# Patient Record
Sex: MALE | Race: WHITE | NOT HISPANIC OR LATINO | Employment: FULL TIME | URBAN - METROPOLITAN AREA
[De-identification: names, ages, dates, MRNs, and addresses within clinical notes are randomized per-mention and may not be internally consistent; named-entity substitution may affect disease eponyms.]

---

## 2017-12-17 ENCOUNTER — OFFICE VISIT (OUTPATIENT)
Dept: URGENT CARE | Facility: CLINIC | Age: 67
End: 2017-12-17
Payer: MEDICARE

## 2017-12-17 VITALS
WEIGHT: 225 LBS | TEMPERATURE: 98 F | HEART RATE: 73 BPM | BODY MASS INDEX: 38.41 KG/M2 | HEIGHT: 64 IN | OXYGEN SATURATION: 96 % | SYSTOLIC BLOOD PRESSURE: 125 MMHG | RESPIRATION RATE: 18 BRPM | DIASTOLIC BLOOD PRESSURE: 72 MMHG

## 2017-12-17 DIAGNOSIS — R68.89 FLU-LIKE SYMPTOMS: ICD-10-CM

## 2017-12-17 DIAGNOSIS — J32.9 SINUSITIS, UNSPECIFIED CHRONICITY, UNSPECIFIED LOCATION: Primary | ICD-10-CM

## 2017-12-17 LAB
CTP QC/QA: YES
FLUAV AG NPH QL: NEGATIVE
FLUBV AG NPH QL: NEGATIVE

## 2017-12-17 PROCEDURE — 87804 INFLUENZA ASSAY W/OPTIC: CPT | Mod: QW,S$GLB,, | Performed by: INTERNAL MEDICINE

## 2017-12-17 PROCEDURE — 99213 OFFICE O/P EST LOW 20 MIN: CPT | Mod: 25,S$GLB,, | Performed by: INTERNAL MEDICINE

## 2017-12-17 RX ORDER — ASPIRIN 81 MG/1
81 TABLET ORAL DAILY
COMMUNITY

## 2017-12-17 RX ORDER — AMOXICILLIN AND CLAVULANATE POTASSIUM 875; 125 MG/1; MG/1
1 TABLET, FILM COATED ORAL 2 TIMES DAILY
Qty: 20 TABLET | Refills: 0 | Status: SHIPPED | OUTPATIENT
Start: 2017-12-17 | End: 2017-12-27

## 2017-12-17 RX ORDER — LOSARTAN POTASSIUM 100 MG/1
100 TABLET ORAL DAILY
COMMUNITY
End: 2020-04-27

## 2017-12-17 RX ORDER — CARVEDILOL 12.5 MG/1
12.5 TABLET ORAL 2 TIMES DAILY WITH MEALS
COMMUNITY
End: 2021-10-13

## 2017-12-17 RX ORDER — ATORVASTATIN CALCIUM 20 MG/1
20 TABLET, FILM COATED ORAL DAILY
COMMUNITY
End: 2021-10-13

## 2017-12-17 RX ORDER — AMLODIPINE BESYLATE 10 MG/1
10 TABLET ORAL DAILY
COMMUNITY
End: 2021-10-13

## 2017-12-17 RX ORDER — CLOMIPHENE CITRATE 50 MG/1
50 TABLET ORAL
COMMUNITY

## 2017-12-17 RX ORDER — METFORMIN HYDROCHLORIDE 500 MG/1
500 TABLET ORAL 2 TIMES DAILY WITH MEALS
COMMUNITY
End: 2021-10-13

## 2017-12-17 RX ORDER — MONTELUKAST SODIUM 10 MG/1
10 TABLET ORAL NIGHTLY PRN
COMMUNITY
End: 2023-11-01

## 2017-12-17 RX ORDER — OMEPRAZOLE 20 MG/1
20 CAPSULE, DELAYED RELEASE ORAL DAILY
COMMUNITY
End: 2021-10-13

## 2017-12-17 RX ORDER — FLUTICASONE PROPIONATE 50 MCG
1 SPRAY, SUSPENSION (ML) NASAL DAILY
COMMUNITY
End: 2021-10-13

## 2017-12-17 RX ORDER — FENOFIBRATE 160 MG/1
160 TABLET ORAL DAILY
COMMUNITY
End: 2019-11-05 | Stop reason: SDUPTHER

## 2017-12-17 RX ORDER — LORAZEPAM 0.5 MG/1
0.5 TABLET ORAL EVERY 6 HOURS PRN
COMMUNITY
End: 2021-10-13

## 2017-12-17 NOTE — PATIENT INSTRUCTIONS
Sinusitis (Antibiotic Treatment)    The sinuses are air-filled spaces within the bones of the face. They connect to the inside of the nose. Sinusitis is an inflammation of the tissue lining the sinus cavity. Sinus inflammation can occur during a cold. It can also be due to allergies to pollens and other particles in the air. Sinusitis can cause symptoms of sinus congestion and fullness. A sinus infection causes fever, headache and facial pain. There is often green or yellow drainage from the nose or into the back of the throat (post-nasal drip). You have been given antibiotics to treat this condition.  Home care:  · Take the full course of antibiotics as instructed. Do not stop taking them, even if you feel better.  · Drink plenty of water, hot tea, and other liquids. This may help thin mucus. It also may promote sinus drainage.  · Heat may help soothe painful areas of the face. Use a towel soaked in hot water. Or,  the shower and direct the hot spray onto your face. Using a vaporizer along with a menthol rub at night may also help.   · An expectorant containing guaifenesin may help thin the mucus and promote drainage from the sinuses.  · Over-the-counter decongestants may be used unless a similar medicine was prescribed. Nasal sprays work the fastest. Use one that contains phenylephrine or oxymetazoline. First blow the nose gently. Then use the spray. Do not use these medicines more often than directed on the label or symptoms may get worse. You may also use tablets containing pseudoephedrine. Avoid products that combine ingredients, because side effects may be increased. Read labels. You can also ask the pharmacist for help. (NOTE: Persons with high blood pressure should not use decongestants. They can raise blood pressure.)  · Over-the-counter antihistamines may help if allergies contributed to your sinusitis.    · Do not use nasal rinses or irrigation during an acute sinus infection, unless told to by  your health care provider. Rinsing may spread the infection to other sinuses.  · Use acetaminophen or ibuprofen to control pain, unless another pain medicine was prescribed. (If you have chronic liver or kidney disease or ever had a stomach ulcer, talk with your doctor before using these medicines. Aspirin should never be used in anyone under 18 years of age who is ill with a fever. It may cause severe liver damage.)  · Don't smoke. This can worsen symptoms.  Follow-up care  Follow up with your healthcare provider or our staff if you are not improving within the next week.  When to seek medical advice  Call your healthcare provider if any of these occur:  · Facial pain or headache becoming more severe  · Stiff neck  · Unusual drowsiness or confusion  · Swelling of the forehead or eyelids  · Vision problems, including blurred or double vision  · Fever of 100.4ºF (38ºC) or higher, or as directed by your healthcare provider  · Seizure  · Breathing problems  · Symptoms not resolving within 10 days

## 2017-12-17 NOTE — PROGRESS NOTES
"Subjective:       Patient ID: Ori Arita is a 67 y.o. male.    Vitals:  height is 5' 4" (1.626 m) and weight is 102.1 kg (225 lb). His tympanic temperature is 98.4 °F (36.9 °C). His blood pressure is 125/72 and his pulse is 73. His respiration is 18 and oxygen saturation is 96%.     Chief Complaint: Cough    He reports purulent sputum for the past 2 days      Cough   This is a new problem. The current episode started in the past 7 days. The problem has been gradually worsening. The problem occurs every few minutes. The cough is productive of sputum. Associated symptoms include headaches, nasal congestion and a sore throat. Pertinent negatives include no chest pain, chills, ear pain, eye redness, fever, myalgias, shortness of breath or wheezing. Treatments tried: mucinex. The treatment provided no relief.     Review of Systems   Constitution: Positive for malaise/fatigue. Negative for chills and fever.   HENT: Positive for congestion and sore throat. Negative for ear pain and hoarse voice.    Eyes: Negative for discharge and redness.   Cardiovascular: Negative for chest pain, dyspnea on exertion and leg swelling.   Respiratory: Positive for cough and sputum production. Negative for shortness of breath and wheezing.    Musculoskeletal: Negative for myalgias.   Gastrointestinal: Negative for abdominal pain and nausea.   Neurological: Positive for headaches.       Objective:      Physical Exam   Constitutional: He is oriented to person, place, and time. He appears well-developed and well-nourished. He is cooperative.  Non-toxic appearance. He does not appear ill. No distress.   HENT:   Head: Normocephalic and atraumatic.   Right Ear: Hearing, tympanic membrane, external ear and ear canal normal.   Left Ear: Hearing, tympanic membrane, external ear and ear canal normal.   Nose: Nose normal. No mucosal edema, rhinorrhea or nasal deformity. No epistaxis. Right sinus exhibits no maxillary sinus tenderness and no " frontal sinus tenderness. Left sinus exhibits no maxillary sinus tenderness and no frontal sinus tenderness.   Mouth/Throat: Uvula is midline and mucous membranes are normal. No trismus in the jaw. Normal dentition. No uvula swelling. Posterior oropharyngeal erythema present.   Eyes: Conjunctivae and lids are normal.   Sclera clear bilat   Neck: Trachea normal, full passive range of motion without pain and phonation normal. Neck supple.   Cardiovascular: Normal rate, regular rhythm, normal heart sounds and normal pulses.    Pulmonary/Chest: Effort normal and breath sounds normal. No respiratory distress.   Abdominal: Soft. Normal appearance and bowel sounds are normal.   Lymphadenopathy:     He has no cervical adenopathy.   Neurological: He is alert and oriented to person, place, and time. He exhibits normal muscle tone. Coordination normal.   Skin: Skin is warm, dry and intact. No rash noted.   Psychiatric: He has a normal mood and affect. His speech is normal. Cognition and memory are normal.   Nursing note and vitals reviewed.      Assessment:       1. Sinusitis, unspecified chronicity, unspecified location    2. Flu-like symptoms        Plan:         Sinusitis, unspecified chronicity, unspecified location  -     amoxicillin-clavulanate 875-125mg (AUGMENTIN) 875-125 mg per tablet; Take 1 tablet by mouth 2 (two) times daily.  Dispense: 20 tablet; Refill: 0    Flu-like symptoms  -     POCT Influenza A/B

## 2019-11-05 RX ORDER — FENOFIBRATE 160 MG/1
TABLET ORAL
Qty: 90 TABLET | Refills: 3 | Status: SHIPPED | OUTPATIENT
Start: 2019-11-05 | End: 2021-10-13

## 2019-12-16 RX ORDER — OMEPRAZOLE 40 MG/1
CAPSULE, DELAYED RELEASE ORAL
Qty: 90 CAPSULE | Refills: 3 | Status: SHIPPED | OUTPATIENT
Start: 2019-12-16 | End: 2021-10-13

## 2020-04-27 ENCOUNTER — TELEPHONE (OUTPATIENT)
Dept: FAMILY MEDICINE | Facility: CLINIC | Age: 70
End: 2020-04-27

## 2020-04-27 ENCOUNTER — TELEPHONE (OUTPATIENT)
Dept: PRIMARY CARE CLINIC | Facility: CLINIC | Age: 70
End: 2020-04-27

## 2020-04-27 RX ORDER — LOSARTAN POTASSIUM 100 MG/1
TABLET ORAL
Qty: 90 TABLET | Refills: 0 | Status: SHIPPED | OUTPATIENT
Start: 2020-04-27 | End: 2021-10-15 | Stop reason: SDUPTHER

## 2020-04-27 NOTE — TELEPHONE ENCOUNTER
Tried calling home phone. Called and left a voicemail on wife cell phone to se if he will being seeing Dr Molina here or will he cont at [facility]

## 2020-04-27 NOTE — TELEPHONE ENCOUNTER
----- Message from Lincoln Dukes sent at 4/27/2020  2:55 PM CDT -----  Contact: Gaye Williamson and his wife are no interested in continuing care with Dr Molina due to the distance now.

## 2020-06-25 RX ORDER — LOSARTAN POTASSIUM 100 MG/1
TABLET ORAL
Qty: 90 TABLET | Refills: 0 | OUTPATIENT
Start: 2020-06-25

## 2020-06-25 RX ORDER — ATORVASTATIN CALCIUM 20 MG/1
TABLET, FILM COATED ORAL
Qty: 90 TABLET | Refills: 3 | OUTPATIENT
Start: 2020-06-25

## 2020-07-28 DIAGNOSIS — Z79.899 ENCOUNTER FOR LONG-TERM CURRENT USE OF MEDICATION: Primary | ICD-10-CM

## 2020-07-28 RX ORDER — FENOFIBRATE 160 MG/1
TABLET ORAL
Qty: 90 TABLET | Refills: 0 | OUTPATIENT
Start: 2020-07-28

## 2020-07-28 NOTE — PROGRESS NOTES
Refill Routing Note   Medication(s) are not appropriate for processing by [facility]:       - Required laboratory values are outdated >6 months  - Unclear if patient follows with you      Will follow up with your staff to schedule appointment and labs after your decision.    Medication-related problems identified:   Requires labs  Requires appointment  Medication Therapy Plan: UNCLEAR IF PT. FOLLOWS WITH YOU; PER EPIC DATA NO PAST OR FUTURE OV LISTED; NO PCP LISTED; REQUIRED LAB VALUES ARE OUTDATED >6 MONTHS; NTBO(CMP, LIPID, CBC) PER [facility], DEFER TO YOU  Medication reconciliation completed: No      Automatic Epic Generated Protocol Data:    Orders Placed This Encounter    Comprehensive metabolic panel    Lipid Panel    CBC Without Differential      Requested Prescriptions   Pending Prescriptions Disp Refills    fenofibrate 160 MG Tab [Pharmacy Med Name: FENOFIBRATE 160 MG Tablet] 90 tablet 0     Sig: TAKE 1 TABLET EVERY DAY       There is no refill protocol information for this order           Appointments  past 12m or future 3m with PCP    Date Provider   Last Visit   Visit date not found Laurent Molina MD   Next Visit   Visit date not found Laurent Molina MD   ED visits in past 90 days: 0     Note composed:1:00 PM 07/28/2020      Last Lipids 12 months   No results found for: CHOL No results found for: HDL   No results found for: TRIG No results found for: LDLCALC    No results found for: CHOLHDL     Last CBC 12 months   No results found for: WBC, HGB, RBC, HCT, MCV, PLT        Last LFTs 12 months   No results found for: ALT No results found for: AST   No results found for: BILITOT LFT/TOTBILI-wnl, or within 3x the UNL     Last Creatinine 12 months eGFR: Recommended value:  >30mL/min    No results found for: CREATININE No results found for: EGFRNONAA   No results found for: ESTGFRAFRICA     Appointments     Date Provider   Last Visit   Visit date not found Laurent Molina MD   Next Visit    Visit date not found Laurent Molina MD     ED Visits in past 90 days:    0     Note composed:1:03 PM 07/28/2020

## 2022-03-09 ENCOUNTER — TELEPHONE (OUTPATIENT)
Dept: OTOLARYNGOLOGY | Facility: CLINIC | Age: 72
End: 2022-03-09
Payer: MEDICARE

## 2022-03-09 NOTE — TELEPHONE ENCOUNTER
S/w wife and advised that the pt's sleep results have been scanned into his chart. Wife thanked me for the call.

## 2022-03-09 NOTE — TELEPHONE ENCOUNTER
----- Message from Jenny Rodgers sent at 3/9/2022 11:24 AM CST -----  Contact: rene Huizar spouse  Type: Needs Medical Advice    Who Called: Gaye spouse  Best Call Back Number:[phone]  Additional  Information: pt wife would call back wants to know if office received a large brown envelope containing  pt sleep apnea results, bloodwork, med list & etc  Please Advise- Thank you

## 2022-03-15 ENCOUNTER — OFFICE VISIT (OUTPATIENT)
Dept: OTOLARYNGOLOGY | Facility: CLINIC | Age: 72
End: 2022-03-15
Payer: MEDICARE

## 2022-03-15 VITALS — WEIGHT: 246.69 LBS | BODY MASS INDEX: 42.12 KG/M2 | HEIGHT: 64 IN

## 2022-03-15 DIAGNOSIS — G47.33 OSA (OBSTRUCTIVE SLEEP APNEA): Primary | ICD-10-CM

## 2022-03-15 PROCEDURE — 1126F AMNT PAIN NOTED NONE PRSNT: CPT | Mod: CPTII,S$GLB,, | Performed by: OTOLARYNGOLOGY

## 2022-03-15 PROCEDURE — 99203 PR OFFICE/OUTPT VISIT, NEW, LEVL III, 30-44 MIN: ICD-10-PCS | Mod: S$GLB,,, | Performed by: OTOLARYNGOLOGY

## 2022-03-15 PROCEDURE — 99999 PR PBB SHADOW E&M-EST. PATIENT-LVL III: ICD-10-PCS | Mod: PBBFAC,,, | Performed by: OTOLARYNGOLOGY

## 2022-03-15 PROCEDURE — 1159F MED LIST DOCD IN RCRD: CPT | Mod: CPTII,S$GLB,, | Performed by: OTOLARYNGOLOGY

## 2022-03-15 PROCEDURE — 1160F RVW MEDS BY RX/DR IN RCRD: CPT | Mod: CPTII,S$GLB,, | Performed by: OTOLARYNGOLOGY

## 2022-03-15 PROCEDURE — 1101F PT FALLS ASSESS-DOCD LE1/YR: CPT | Mod: CPTII,S$GLB,, | Performed by: OTOLARYNGOLOGY

## 2022-03-15 PROCEDURE — 3288F PR FALLS RISK ASSESSMENT DOCUMENTED: ICD-10-PCS | Mod: CPTII,S$GLB,, | Performed by: OTOLARYNGOLOGY

## 2022-03-15 PROCEDURE — 4010F ACE/ARB THERAPY RXD/TAKEN: CPT | Mod: CPTII,S$GLB,, | Performed by: OTOLARYNGOLOGY

## 2022-03-15 PROCEDURE — 1101F PR PT FALLS ASSESS DOC 0-1 FALLS W/OUT INJ PAST YR: ICD-10-PCS | Mod: CPTII,S$GLB,, | Performed by: OTOLARYNGOLOGY

## 2022-03-15 PROCEDURE — 1160F PR REVIEW ALL MEDS BY PRESCRIBER/CLIN PHARMACIST DOCUMENTED: ICD-10-PCS | Mod: CPTII,S$GLB,, | Performed by: OTOLARYNGOLOGY

## 2022-03-15 PROCEDURE — 3008F PR BODY MASS INDEX (BMI) DOCUMENTED: ICD-10-PCS | Mod: CPTII,S$GLB,, | Performed by: OTOLARYNGOLOGY

## 2022-03-15 PROCEDURE — 1126F PR PAIN SEVERITY QUANTIFIED, NO PAIN PRESENT: ICD-10-PCS | Mod: CPTII,S$GLB,, | Performed by: OTOLARYNGOLOGY

## 2022-03-15 PROCEDURE — 99999 PR PBB SHADOW E&M-EST. PATIENT-LVL III: CPT | Mod: PBBFAC,,, | Performed by: OTOLARYNGOLOGY

## 2022-03-15 PROCEDURE — 1159F PR MEDICATION LIST DOCUMENTED IN MEDICAL RECORD: ICD-10-PCS | Mod: CPTII,S$GLB,, | Performed by: OTOLARYNGOLOGY

## 2022-03-15 PROCEDURE — 4010F PR ACE/ARB THEARPY RXD/TAKEN: ICD-10-PCS | Mod: CPTII,S$GLB,, | Performed by: OTOLARYNGOLOGY

## 2022-03-15 PROCEDURE — 99203 OFFICE O/P NEW LOW 30 MIN: CPT | Mod: S$GLB,,, | Performed by: OTOLARYNGOLOGY

## 2022-03-15 PROCEDURE — 3288F FALL RISK ASSESSMENT DOCD: CPT | Mod: CPTII,S$GLB,, | Performed by: OTOLARYNGOLOGY

## 2022-03-15 PROCEDURE — 3008F BODY MASS INDEX DOCD: CPT | Mod: CPTII,S$GLB,, | Performed by: OTOLARYNGOLOGY

## 2022-03-15 NOTE — PROGRESS NOTES
Subjective:       Patient ID: Ori Arita is a 72 y.o. male.    Chief Complaint: Sleep Apnea (Discuss inspire//faxing sleep)    Ori is here for Obstructive sleep apnea and intolerance of CPAP.  he was diagnosed with NANCY < 1 years ago.   His wife notices loud snoring and apneas. Patient denies any complaints.   Last sleep study: 2/9/22. FABIÁN / AHI: 31.5; oxygen kenzie: 68  He has CPAP being mailed to him, but has not started it. Has not established with Sleep medicine.   Previous surgical treatments for sleep apnea: none  Body mass index is 42.35 kg/m².      Pertinent medical issues: Htn, DM  Anticoagulation: none  Pertinent surgery: none    Patient validated questionnaires (if applicable):      %       No flowsheet data found.  No flowsheet data found.  No flowsheet data found.         Social History     Tobacco Use   Smoking Status Never Smoker   Smokeless Tobacco Never Used     Social History     Substance and Sexual Activity   Alcohol Use No          Objective:        Constitutional:   He is oriented to person, place, and time. He appears well-developed and well-nourished. He appears alert. He is active.   Obese Normal speech.      Head:  Normocephalic and atraumatic. Head is without TMJ tenderness. No scars. Salivary glands normal.  Facial strength is normal.      Ears:    Right Ear: No drainage or swelling. No middle ear effusion.   Left Ear: No drainage or swelling.  No middle ear effusion.     Nose:  No mucosal edema, rhinorrhea or sinus tenderness. No turbinate hypertrophy.      Mouth/Throat  Oropharynx clear and moist without lesions or asymmetry, normal uvula midline and mirror exam normal. Normal dentition. No uvula swelling, lacerations or trismus. No oropharyngeal exudate. Tonsils present, +2.  Tonsillar erythema, tonsillar exudate.      Neck:  Full range of motion with neck supple and no adenopathy. Thyroid tenderness is present. No tracheal deviation, no edema, no erythema, normal range of  motion, no stridor, no crepitus and no neck rigidity present. No thyroid mass present.     Cardiovascular:   Intact distal pulses and normal pulses.      Pulmonary/Chest:   Effort normal and breath sounds normal. No stridor.     Psychiatric:   His speech is normal and behavior is normal. His mood appears not anxious. His affect is not labile.     Neurological:   He is alert and oriented to person, place, and time. No sensory deficit.     Skin:   No abrasions, lacerations, lesions, or rashes. No abrasion and no bruising noted.         Tests / Results:  none    Assessment:       1. NANCY (obstructive sleep apnea)    2. BMI 40.0-44.9, adult          Plan:         Discussed background of NANCY at length including medical therapy and surgical therapy.  Not candidate for surgical therapy based on BMI  Recommend initiation of CPAP

## 2023-03-14 ENCOUNTER — OFFICE VISIT (OUTPATIENT)
Dept: GASTROENTEROLOGY | Facility: CLINIC | Age: 73
End: 2023-03-14
Payer: MEDICARE

## 2023-03-14 VITALS — HEIGHT: 64 IN | BODY MASS INDEX: 43.4 KG/M2 | WEIGHT: 254.19 LBS

## 2023-03-14 DIAGNOSIS — K21.9 GASTROESOPHAGEAL REFLUX DISEASE, UNSPECIFIED WHETHER ESOPHAGITIS PRESENT: ICD-10-CM

## 2023-03-14 DIAGNOSIS — K22.4 ESOPHAGEAL DYSKINESIA: ICD-10-CM

## 2023-03-14 DIAGNOSIS — E66.01 CLASS 3 SEVERE OBESITY DUE TO EXCESS CALORIES WITH SERIOUS COMORBIDITY AND BODY MASS INDEX (BMI) OF 40.0 TO 44.9 IN ADULT: ICD-10-CM

## 2023-03-14 DIAGNOSIS — I25.10 CORONARY ARTERY DISEASE INVOLVING NATIVE CORONARY ARTERY OF NATIVE HEART WITHOUT ANGINA PECTORIS: ICD-10-CM

## 2023-03-14 DIAGNOSIS — Z86.010 HISTORY OF COLONIC POLYPS: Primary | ICD-10-CM

## 2023-03-14 PROCEDURE — 3288F PR FALLS RISK ASSESSMENT DOCUMENTED: ICD-10-PCS | Mod: CPTII,S$GLB,, | Performed by: INTERNAL MEDICINE

## 2023-03-14 PROCEDURE — 1101F PT FALLS ASSESS-DOCD LE1/YR: CPT | Mod: CPTII,S$GLB,, | Performed by: INTERNAL MEDICINE

## 2023-03-14 PROCEDURE — 1126F AMNT PAIN NOTED NONE PRSNT: CPT | Mod: CPTII,S$GLB,, | Performed by: INTERNAL MEDICINE

## 2023-03-14 PROCEDURE — 99203 PR OFFICE/OUTPT VISIT, NEW, LEVL III, 30-44 MIN: ICD-10-PCS | Mod: S$GLB,,, | Performed by: INTERNAL MEDICINE

## 2023-03-14 PROCEDURE — 4010F ACE/ARB THERAPY RXD/TAKEN: CPT | Mod: CPTII,S$GLB,, | Performed by: INTERNAL MEDICINE

## 2023-03-14 PROCEDURE — 99999 PR PBB SHADOW E&M-EST. PATIENT-LVL III: CPT | Mod: PBBFAC,,, | Performed by: INTERNAL MEDICINE

## 2023-03-14 PROCEDURE — 99203 OFFICE O/P NEW LOW 30 MIN: CPT | Mod: S$GLB,,, | Performed by: INTERNAL MEDICINE

## 2023-03-14 PROCEDURE — 1126F PR PAIN SEVERITY QUANTIFIED, NO PAIN PRESENT: ICD-10-PCS | Mod: CPTII,S$GLB,, | Performed by: INTERNAL MEDICINE

## 2023-03-14 PROCEDURE — 3008F BODY MASS INDEX DOCD: CPT | Mod: CPTII,S$GLB,, | Performed by: INTERNAL MEDICINE

## 2023-03-14 PROCEDURE — 4010F PR ACE/ARB THEARPY RXD/TAKEN: ICD-10-PCS | Mod: CPTII,S$GLB,, | Performed by: INTERNAL MEDICINE

## 2023-03-14 PROCEDURE — 3288F FALL RISK ASSESSMENT DOCD: CPT | Mod: CPTII,S$GLB,, | Performed by: INTERNAL MEDICINE

## 2023-03-14 PROCEDURE — 3008F PR BODY MASS INDEX (BMI) DOCUMENTED: ICD-10-PCS | Mod: CPTII,S$GLB,, | Performed by: INTERNAL MEDICINE

## 2023-03-14 PROCEDURE — 99999 PR PBB SHADOW E&M-EST. PATIENT-LVL III: ICD-10-PCS | Mod: PBBFAC,,, | Performed by: INTERNAL MEDICINE

## 2023-03-14 PROCEDURE — 1101F PR PT FALLS ASSESS DOC 0-1 FALLS W/OUT INJ PAST YR: ICD-10-PCS | Mod: CPTII,S$GLB,, | Performed by: INTERNAL MEDICINE

## 2023-03-14 RX ORDER — OMEGA-3-ACID ETHYL ESTERS 1 G/1
2 CAPSULE, LIQUID FILLED ORAL 2 TIMES DAILY
COMMUNITY

## 2023-03-14 NOTE — PROGRESS NOTES
Subjective:       Patient ID: Ori Arita is a 73 y.o. male.    Chief Complaint: Establish Care and history of colon polyos    This is my 1st encounter with Mr. Arita, who is here with his wife today, for both of them to establish in our clinic.  They received their previous GI care through Dr. Velasquez in [place].  He has a history of colon polyps, and is coming due for colonoscopy.  He also has a history of reflux as well as esophageal dyskinesia (see esophagram below).  He also has a history of coronary artery disease, see the recent coronary angiogram report below.  History of DM 2.              FLUOROSCOPIC ESOPHAGRAM: 8/17/2021 11:08 AM  CLINICAL HISTORY: 71 years of age, Male, [facility]  Reason for Exam: ALSO [facility]  Reason for Visit: Dysphagia, unspecified; Dyskinesia of esophagus.    PROCEDURE COMMENTS: Fluoroscopic evaluation of the esophagus was performed during and following oral administration of contrast. Effervescent granules were administered during the double contrast portion. Barium contrast was used.    FINDINGS:  Total fluoroscopy time was 29 seconds. Multiple images/series were obtained; please see PACS for additional details.  Pre-procedure radiograph: Bowel gas is present in a nonobstructive pattern. There is no visible pneumoperitoneum, abnormal abdominal calcification, or abnormal mass.  Swallow: Normal. No aspiration.  Esophagus: The esophagus is normal in morphology with no masses, strictures or ulcerations. Esophageal peristalsis is normal. The pharyngoesophageal junction is normal with no sign of cricopharyngeal spasm.  Stomach: Contrast passes easily into a normal-appearing stomach.  Additional findings: Small sliding hiatal hernia. No gastroesophageal reflux occurred during the examination.    IMPRESSION:  1. Small sliding hiatal hernia. No gastroesophageal reflux occurred during the examination.  2. Esophageal peristalsis is normal. No masses, strictures, or ulcerations.  **    Final   **  Finalized: Vipin CHIN, Gino CHRISTINE        Had Cor Angio recently  IMPRESSIONS:   · Total chronic occlusion of the LAD being filled from RCA.  · Moderate disease of the upper branch of the ramus intermedius branch.  · No significant disease of the left circumflex and the right coronary   artery.  RECOMMENDATIONS:   · Medical treatment     Hemodynamics;  Aortic pressure; 160/74  LVEDP; 12-14 millimeter of mercury  There is no gradient across the aortic valve on pullback of the pigtail catheter.  Left Ventriculogram:  . Not performed    Coronary angiogram  Left Main:   Large caliber vessel. Calcified. Mild tapering distally without any obstructive lesion.  Left anterior descending artery;   Store totally occluded at the ostium with small residual nub. The LAD and diagonal branches being filled via collaterals from the right coronary vessel. It appears to be a medium caliber vessel. The exact size may be underestimation due to underfilling  Left Circumflex artery;  Large caliber vessel. No significant disease noted. It gives rise to a large obtuse marginal branch which originates distally and free of significant disease. Another small obtuse marginal branch which originates further distally is free of significant disease.  A medium size ramus intermedius branch divides into 2 branches proximally. The lower branch is free of significant disease. The upper branch which is relatively smaller has about 50-60% stenosis a long tubular fashion proximally.  Right coronary artery is large and dominant.It gives rise to Posterior descending artey   The right coronary artery itself is free of significant disease.  A small to medium size right ventricular branch has about 60-70% stenosis.   The posterior descending artery is free of significant disease.   The posterolateral branch is also free of significant disease. The RCA is feeding collaterals into the LAD diagonal system.  Conclusion;  Emma Ovalle MD       Review of  Systems   Constitutional:  Negative for activity change, appetite change, fatigue, fever and unexpected weight change.   HENT:  Positive for trouble swallowing (Rarely has dysphagia problems.). Negative for dental problem, drooling, mouth sores, sore throat and voice change.    Eyes: Negative.    Respiratory:  Negative for cough, choking, shortness of breath, wheezing and stridor.    Cardiovascular:  Negative for chest pain.   Gastrointestinal:  Negative for abdominal distention, abdominal pain, anal bleeding, blood in stool, constipation, diarrhea, nausea, rectal pain and vomiting.   Genitourinary: Negative.    Musculoskeletal:  Negative for arthralgias, joint swelling, myalgias and neck stiffness.   Integumentary:  Negative for color change and rash.   Neurological:  Negative for weakness.   Hematological:  Negative for adenopathy. Does not bruise/bleed easily.   Psychiatric/Behavioral:  Negative for agitation, behavioral problems, confusion, decreased concentration and dysphoric mood.          Objective:      Physical Exam  Vitals and nursing note reviewed.   Constitutional:       General: He is not in acute distress.     Appearance: Normal appearance. He is well-developed. He is obese.   HENT:      Head: Normocephalic.      Nose: Nose normal.      Mouth/Throat:      Mouth: Mucous membranes are moist.      Pharynx: No oropharyngeal exudate.   Eyes:      General: No scleral icterus.     Conjunctiva/sclera: Conjunctivae normal.   Neck:      Thyroid: No thyromegaly.      Trachea: No tracheal deviation.   Cardiovascular:      Rate and Rhythm: Normal rate and regular rhythm.      Heart sounds: Normal heart sounds. No murmur heard.     No gallop.   Pulmonary:      Effort: Pulmonary effort is normal.      Breath sounds: Normal breath sounds. No wheezing or rales.   Abdominal:      General: Bowel sounds are normal. There is no distension.      Palpations: Abdomen is soft. There is no mass.      Tenderness: There is no  abdominal tenderness. There is no guarding.   Genitourinary:     Rectum: Guaiac result negative.      Comments: Rectal exam normal.  Normal tone. No masses. Prostate normal.  Musculoskeletal:         General: Normal range of motion.      Cervical back: Neck supple.   Lymphadenopathy:      Cervical: No cervical adenopathy.   Skin:     General: Skin is warm and dry.      Findings: No erythema or rash.   Neurological:      General: No focal deficit present.      Mental Status: He is alert and oriented to person, place, and time.   Psychiatric:         Mood and Affect: Mood normal.         Behavior: Behavior normal.         Assessment:       History of colonic polyps    Gastroesophageal reflux disease, unspecified whether esophagitis present    Esophageal dyskinesia    Coronary artery disease involving native coronary artery of native heart without angina pectoris    Class 3 severe obesity due to excess calories with serious comorbidity and body mass index (BMI) of 40.0 to 44.9 in adult        Plan:       Continue meds the same.  Let us know when he is ready to schedule his next colonoscopy.  RTC p.r.n..

## 2023-09-12 ENCOUNTER — OFFICE VISIT (OUTPATIENT)
Dept: GASTROENTEROLOGY | Facility: CLINIC | Age: 73
End: 2023-09-12
Payer: MEDICARE

## 2023-09-12 VITALS — BODY MASS INDEX: 36.84 KG/M2 | WEIGHT: 215.81 LBS | HEIGHT: 64 IN

## 2023-09-12 DIAGNOSIS — Z86.010 HISTORY OF COLONIC POLYPS: ICD-10-CM

## 2023-09-12 DIAGNOSIS — I25.10 CORONARY ARTERY DISEASE INVOLVING NATIVE CORONARY ARTERY OF NATIVE HEART WITHOUT ANGINA PECTORIS: ICD-10-CM

## 2023-09-12 DIAGNOSIS — K22.4 ESOPHAGEAL DYSKINESIA: ICD-10-CM

## 2023-09-12 DIAGNOSIS — K21.9 GASTROESOPHAGEAL REFLUX DISEASE, UNSPECIFIED WHETHER ESOPHAGITIS PRESENT: Primary | ICD-10-CM

## 2023-09-12 PROCEDURE — 4010F ACE/ARB THERAPY RXD/TAKEN: CPT | Mod: CPTII,S$GLB,, | Performed by: INTERNAL MEDICINE

## 2023-09-12 PROCEDURE — 1126F AMNT PAIN NOTED NONE PRSNT: CPT | Mod: CPTII,S$GLB,, | Performed by: INTERNAL MEDICINE

## 2023-09-12 PROCEDURE — 3288F PR FALLS RISK ASSESSMENT DOCUMENTED: ICD-10-PCS | Mod: CPTII,S$GLB,, | Performed by: INTERNAL MEDICINE

## 2023-09-12 PROCEDURE — 99213 PR OFFICE/OUTPT VISIT, EST, LEVL III, 20-29 MIN: ICD-10-PCS | Mod: S$GLB,,, | Performed by: INTERNAL MEDICINE

## 2023-09-12 PROCEDURE — 4010F PR ACE/ARB THEARPY RXD/TAKEN: ICD-10-PCS | Mod: CPTII,S$GLB,, | Performed by: INTERNAL MEDICINE

## 2023-09-12 PROCEDURE — 1101F PR PT FALLS ASSESS DOC 0-1 FALLS W/OUT INJ PAST YR: ICD-10-PCS | Mod: CPTII,S$GLB,, | Performed by: INTERNAL MEDICINE

## 2023-09-12 PROCEDURE — 99213 OFFICE O/P EST LOW 20 MIN: CPT | Mod: S$GLB,,, | Performed by: INTERNAL MEDICINE

## 2023-09-12 PROCEDURE — 3288F FALL RISK ASSESSMENT DOCD: CPT | Mod: CPTII,S$GLB,, | Performed by: INTERNAL MEDICINE

## 2023-09-12 PROCEDURE — 99999 PR PBB SHADOW E&M-EST. PATIENT-LVL III: ICD-10-PCS | Mod: PBBFAC,,, | Performed by: INTERNAL MEDICINE

## 2023-09-12 PROCEDURE — 1101F PT FALLS ASSESS-DOCD LE1/YR: CPT | Mod: CPTII,S$GLB,, | Performed by: INTERNAL MEDICINE

## 2023-09-12 PROCEDURE — 1159F PR MEDICATION LIST DOCUMENTED IN MEDICAL RECORD: ICD-10-PCS | Mod: CPTII,S$GLB,, | Performed by: INTERNAL MEDICINE

## 2023-09-12 PROCEDURE — 1126F PR PAIN SEVERITY QUANTIFIED, NO PAIN PRESENT: ICD-10-PCS | Mod: CPTII,S$GLB,, | Performed by: INTERNAL MEDICINE

## 2023-09-12 PROCEDURE — 1159F MED LIST DOCD IN RCRD: CPT | Mod: CPTII,S$GLB,, | Performed by: INTERNAL MEDICINE

## 2023-09-12 PROCEDURE — 3008F PR BODY MASS INDEX (BMI) DOCUMENTED: ICD-10-PCS | Mod: CPTII,S$GLB,, | Performed by: INTERNAL MEDICINE

## 2023-09-12 PROCEDURE — 3008F BODY MASS INDEX DOCD: CPT | Mod: CPTII,S$GLB,, | Performed by: INTERNAL MEDICINE

## 2023-09-12 PROCEDURE — 99999 PR PBB SHADOW E&M-EST. PATIENT-LVL III: CPT | Mod: PBBFAC,,, | Performed by: INTERNAL MEDICINE

## 2023-09-12 NOTE — PROGRESS NOTES
Subjective     Patient ID: Ori Arita is a 73 y.o. male.    Chief Complaint: Other (F/u)    Mr. Arita returns, with his wife, for routine follow-up.  He underwent gastric sleeve surgery on August 7th at [facility] ( former [facility]).  He did well with the surgery, and had an uneventful recovery.  He has since lost 40 lb (see weight table below).  He is in the process of stopping some of his medications (see Cardiology note below).  He has not had any significant reflux episodes.  No dysphagia.    Review of records shows that his next colonoscopy will be due in April, 2024.         See recent Card note  Progress Notes  Emma Ovalle MD - 09/06/2023 2:30 PM CDT  Cardiology Clinic Note    Chief Complaint;  Coronary artery disease   Hypertension  Hyperlipidemia  Diabetes  Obesity    Ori Arita Jr. is a 73 y.o. male  Patient is here after 6 months  He had normal coronary angiogram on 03/01/2023 it was done secondary to abnormal stress test. It showed occluded LAD at the origin. Was collateralized from the right coronary artery the ramus intermedius branch has 2 branches 1 of them had moderate disease. Patient denies any chest pain. These findings have been explained to the patient with the help of a diagram and the coronary angiogram on the monitor in the past.    The patient underwent bariatric surgery and has lost 40 lb since then.  Labs from a week ago reviewed. Triglycerides 226 it used to be for 25 patient was treated with omega-3 fatty acid  He was also on Lipitor 20 mg daily. His LDL which was 68 is now 110. Patient mentioned that his bariatric surgeon has suggested he discontinue Lipitor and his diabetes does not persist anymore after the bariatric surgery  Patient has obstructive sleep apnea     Assessment and Plan:   Encounter Diagnoses   Name Primary?   · Dyslipidemia Yes   · Coronary artery disease involving native coronary artery of native heart without angina pectoris    · Class 2 severe obesity due to excess calories with serious comorbidity in adult, unspecified BMI (CMS/HCC)   · Essential hypertension    Resume Lipitor 20 mg daily. Continue omega-3 fatty acids as well as carvedilol     Return in about 4 months (around 1/6/2024).   Emma Ovalle MD            Wt Readings from Last 20 Encounters:  09/12/23 : 97.9 kg (215 lb 13.3 oz)  04/12/23 : 114.8 kg (253 lb)  03/14/23 : 115.3 kg (254 lb 3.1 oz)  10/19/22 : 113.4 kg (250 lb)  04/06/22 : 111.1 kg (245 lb)  03/15/22 : 111.9 kg (246 lb 11.1 oz)  10/15/21 : 104.8 kg (231 lb)  06/15/21 : 107.3 kg (236 lb 9.6 oz)  05/11/21 : 108.2 kg (238 lb 9.6 oz)  11/24/20 : 102 kg (224 lb 12.8 oz)  10/27/20 : 108.8 kg (239 lb 12.8 oz)  12/17/17 : 102.1 kg (225 lb)        Review of Systems   Constitutional:  Negative for appetite change, fever and unexpected weight change.   HENT: Negative.  Negative for mouth sores, sore throat and trouble swallowing.    Eyes: Negative.    Respiratory: Negative.  Negative for cough and choking.    Cardiovascular: Negative.  Negative for chest pain and leg swelling.   Gastrointestinal:  Negative for abdominal distention, abdominal pain, anal bleeding, blood in stool, constipation, diarrhea, nausea and vomiting.   Genitourinary: Negative.    Musculoskeletal: Negative.    Integumentary:  Negative for color change and rash. Negative.   Neurological: Negative.    Hematological:  Negative for adenopathy.   Psychiatric/Behavioral: Negative.            Objective     Physical Exam  Vitals and nursing note reviewed.   Constitutional:       Appearance: Normal appearance. He is well-developed.   HENT:      Mouth/Throat:      Mouth: Mucous membranes are moist.      Pharynx: No oropharyngeal exudate.   Eyes:      General: No scleral icterus.  Neck:      Thyroid: No thyromegaly.      Trachea: No tracheal deviation.   Cardiovascular:      Rate and Rhythm: Normal rate and regular rhythm.      Heart sounds: Normal heart sounds.    Pulmonary:      Effort: Pulmonary effort is normal.      Breath sounds: Normal breath sounds.   Abdominal:      General: Bowel sounds are normal. There is no distension.      Palpations: Abdomen is soft. There is no mass.      Tenderness: There is no abdominal tenderness. There is no guarding.      Comments: He has barely noticeable small incisions, all well healed.  And nontender.   Lymphadenopathy:      Cervical: No cervical adenopathy.   Skin:     General: Skin is warm and dry.      Findings: No rash.   Neurological:      General: No focal deficit present.      Mental Status: He is alert and oriented to person, place, and time.   Psychiatric:         Mood and Affect: Mood normal.         Behavior: Behavior normal.            Assessment and Plan     Gastroesophageal reflux disease, unspecified whether esophagitis present    Esophageal dyskinesia    History of colonic polyps    Coronary artery disease involving native coronary artery of native heart without angina pectoris    BMI 37.0-37.9, adult      Continue meds the same.  Colonoscopy due in April, 2024.

## 2023-09-18 NOTE — PROGRESS NOTES
Subjective     Patient ID: Ori Arita is a 73 y.o. male.    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       Objective     Physical Exam       Assessment and Plan     1. History of colonic polyps    2. Gastroesophageal reflux disease, unspecified whether esophagitis present    3. Esophageal dyskinesia    4. Coronary artery disease involving native coronary artery of native heart without angina pectoris    5. Class 3 severe obesity due to excess calories with serious comorbidity and body mass index (BMI) of 40.0 to 44.9 in adult                 No follow-ups on file.

## 2024-03-13 ENCOUNTER — OFFICE VISIT (OUTPATIENT)
Dept: UROLOGY | Facility: CLINIC | Age: 74
End: 2024-03-13
Payer: MEDICARE

## 2024-03-13 VITALS — WEIGHT: 188 LBS | HEIGHT: 64 IN | BODY MASS INDEX: 32.1 KG/M2

## 2024-03-13 DIAGNOSIS — Z12.5 SCREENING FOR PROSTATE CANCER: ICD-10-CM

## 2024-03-13 DIAGNOSIS — N40.0 BENIGN PROSTATIC HYPERPLASIA WITHOUT LOWER URINARY TRACT SYMPTOMS: Primary | ICD-10-CM

## 2024-03-13 LAB
BILIRUBIN, UA POC OHS: NEGATIVE
BLOOD, UA POC OHS: NEGATIVE
CLARITY, UA POC OHS: CLEAR
COLOR, UA POC OHS: YELLOW
GLUCOSE, UA POC OHS: NEGATIVE
KETONES, UA POC OHS: NEGATIVE
LEUKOCYTES, UA POC OHS: ABNORMAL
NITRITE, UA POC OHS: NEGATIVE
PH, UA POC OHS: 5.5
PROTEIN, UA POC OHS: NEGATIVE
SPECIFIC GRAVITY, UA POC OHS: 1.02
UROBILINOGEN, UA POC OHS: 0.2

## 2024-03-13 PROCEDURE — 99203 OFFICE O/P NEW LOW 30 MIN: CPT | Mod: S$GLB,,, | Performed by: UROLOGY

## 2024-03-13 PROCEDURE — 1101F PT FALLS ASSESS-DOCD LE1/YR: CPT | Mod: CPTII,S$GLB,, | Performed by: UROLOGY

## 2024-03-13 PROCEDURE — 1159F MED LIST DOCD IN RCRD: CPT | Mod: CPTII,S$GLB,, | Performed by: UROLOGY

## 2024-03-13 PROCEDURE — 3288F FALL RISK ASSESSMENT DOCD: CPT | Mod: CPTII,S$GLB,, | Performed by: UROLOGY

## 2024-03-13 PROCEDURE — 99999 PR PBB SHADOW E&M-EST. PATIENT-LVL III: CPT | Mod: PBBFAC,,, | Performed by: UROLOGY

## 2024-03-13 PROCEDURE — 81003 URINALYSIS AUTO W/O SCOPE: CPT | Mod: QW,S$GLB,, | Performed by: UROLOGY

## 2024-03-13 PROCEDURE — 1126F AMNT PAIN NOTED NONE PRSNT: CPT | Mod: CPTII,S$GLB,, | Performed by: UROLOGY

## 2024-03-13 PROCEDURE — 4010F ACE/ARB THERAPY RXD/TAKEN: CPT | Mod: CPTII,S$GLB,, | Performed by: UROLOGY

## 2024-03-13 RX ORDER — OMEPRAZOLE 40 MG/1
40 CAPSULE, DELAYED RELEASE ORAL DAILY PRN
COMMUNITY
Start: 2023-12-05

## 2024-03-13 RX ORDER — CLINDAMYCIN PHOSPHATE 11.9 MG/ML
SOLUTION TOPICAL
COMMUNITY
Start: 2024-02-13

## 2024-03-13 NOTE — PROGRESS NOTES
Subjective:       Patient ID: Ori Arita is a 74 y.o. male.    Chief Complaint: Establish Care    HPI    74-year-old here to establish care.  He has been followed by Dr. Calles for years.  He gets regular prostate cancer screening.   His last PSA is 2.43.  He has no bothersome urinary symptoms.  He takes no BPH medications.  He denies hematuria and dysuria.  He has no family history of prostate cancer.  He had gastric sleeve procedure last year in his lost 65 lb.  We discussed PSA screening in general.  We discussed the controversy regarding continued screening late in life.  He wants to continue annual follow-up with PSA.  Urine dipstick shows negative for all components  Except trace leukocyte.     PROSTATE SPECIFIC ANTIGEN, SCR - [facility]   Latest Ref Rng < OR = 4.00 ng/mL   10/18/2023 2.43        Past Medical History:   Diagnosis Date    Anxiety     Diabetes mellitus, type 2     GERD (gastroesophageal reflux disease)     Hyperlipidemia     Hypertension     NANCY (obstructive sleep apnea)     Seasonal allergies      Past Surgical History:   Procedure Laterality Date    CATARACT EXTRACTION, BILATERAL  2018    Cysts  2018    Benign cyst off lower left eyelid     Cysts  2019    Malignant Cell cyst above left eye    Cysts Removal  1958    Benign cysts behind right knee    gastric sleeve      HEMORRHOID SURGERY  10/11/2021    SINUS SURGERY  2017    Deviated Septum       Current Outpatient Medications:     aspirin (ECOTRIN) 81 MG EC tablet, Take 81 mg by mouth once daily., Disp: , Rfl:     atorvastatin (LIPITOR) 20 MG tablet, Take 1 tablet (20 mg total) by mouth every evening., Disp: 90 tablet, Rfl: 3    azelastine (ASTELIN) 137 mcg (0.1 %) nasal spray, SPRAY 1 SPRAY BY NASAL ROUTE 2 TIMES DAILY., Disp: 10 mL, Rfl: 11    carvediloL (COREG) 12.5 MG tablet, Take 12.5 mg by mouth every evening., Disp: , Rfl:     ciclesonide (OMNARIS) 50 mcg Spry, 2 sprays by Each Nostril route 2 (two) times daily., Disp: , Rfl:      clindamycin (CLEOCIN T) 1 % external solution, Apply topically., Disp: , Rfl:     clomiPHENE (CLOMID) 50 mg tablet, Take 50 mg by mouth twice a week. , Disp: , Rfl:     fluticasone propionate (FLONASE) 50 mcg/actuation nasal spray, by Each Nostril route., Disp: , Rfl:     losartan (COZAAR) 100 MG tablet, Take 1 tablet (100 mg total) by mouth once daily., Disp: 90 tablet, Rfl: 3    mupirocin (BACTROBAN) 2 % ointment, APPLY TO AFFECTED AREA AS NEEDED, Disp: , Rfl:     omega-3 acid ethyl esters (LOVAZA) 1 gram capsule, Take 2 g by mouth 2 (two) times daily., Disp: , Rfl:     omeprazole (PRILOSEC) 40 MG capsule, Take 40 mg by mouth daily as needed., Disp: , Rfl:     traZODone (DESYREL) 50 MG tablet, Take 1 tablet (50 mg total) by mouth every evening., Disp: 90 tablet, Rfl: 3    triamcinolone acetonide 0.1% (KENALOG) 0.1 % cream, daily as needed., Disp: , Rfl:       Review of Systems   Constitutional:  Negative for fever.   Genitourinary:  Negative for dysuria and hematuria.       Objective:      Physical Exam  Vitals reviewed.   Constitutional:       Appearance: He is well-developed.   Pulmonary:      Effort: Pulmonary effort is normal.   Abdominal:      Palpations: Abdomen is soft.   Skin:     Findings: No rash.   Neurological:      Mental Status: He is alert and oriented to person, place, and time.         Assessment:       1. Benign prostatic hyperplasia without lower urinary tract symptoms    2. Screening for prostate cancer        Plan:       Benign prostatic hyperplasia without lower urinary tract symptoms  -     POCT Urinalysis(Instrument)    Screening for prostate cancer        Recommend annual follow-up with PSA

## 2024-04-12 ENCOUNTER — OFFICE VISIT (OUTPATIENT)
Dept: FAMILY MEDICINE | Facility: CLINIC | Age: 74
End: 2024-04-12
Payer: MEDICARE

## 2024-04-12 VITALS
BODY MASS INDEX: 32.1 KG/M2 | RESPIRATION RATE: 20 BRPM | OXYGEN SATURATION: 97 % | DIASTOLIC BLOOD PRESSURE: 70 MMHG | WEIGHT: 188 LBS | HEIGHT: 64 IN | HEART RATE: 55 BPM | SYSTOLIC BLOOD PRESSURE: 120 MMHG

## 2024-04-12 DIAGNOSIS — M43.16 SPONDYLOLISTHESIS OF LUMBAR REGION: Chronic | ICD-10-CM

## 2024-04-12 DIAGNOSIS — Z00.00 PREVENTATIVE HEALTH CARE: Primary | ICD-10-CM

## 2024-04-12 DIAGNOSIS — D48.19 STROMAL TUMOR DETERMINED BY GASTRIC BIOPSY: Chronic | ICD-10-CM

## 2024-04-12 DIAGNOSIS — I10 HYPERTENSION, UNSPECIFIED TYPE: Chronic | ICD-10-CM

## 2024-04-12 DIAGNOSIS — I25.10 CORONARY ARTERY DISEASE INVOLVING NATIVE CORONARY ARTERY OF NATIVE HEART WITHOUT ANGINA PECTORIS: Chronic | ICD-10-CM

## 2024-04-12 DIAGNOSIS — J32.9 CHRONIC SINUSITIS, UNSPECIFIED LOCATION: Chronic | ICD-10-CM

## 2024-04-12 DIAGNOSIS — Z76.89 ENCOUNTER TO ESTABLISH CARE WITH NEW DOCTOR: ICD-10-CM

## 2024-04-12 DIAGNOSIS — N40.0 BPH WITHOUT OBSTRUCTION/LOWER URINARY TRACT SYMPTOMS: Chronic | ICD-10-CM

## 2024-04-12 DIAGNOSIS — Z86.39 HISTORY OF DIABETES MELLITUS, TYPE II: Chronic | ICD-10-CM

## 2024-04-12 DIAGNOSIS — K21.9 GASTROESOPHAGEAL REFLUX DISEASE, UNSPECIFIED WHETHER ESOPHAGITIS PRESENT: Chronic | ICD-10-CM

## 2024-04-12 DIAGNOSIS — F51.01 PRIMARY INSOMNIA: Chronic | ICD-10-CM

## 2024-04-12 DIAGNOSIS — E78.5 HYPERLIPIDEMIA, UNSPECIFIED HYPERLIPIDEMIA TYPE: Chronic | ICD-10-CM

## 2024-04-12 PROBLEM — E29.1 TESTICULAR HYPOFUNCTION: Status: ACTIVE | Noted: 2024-03-30

## 2024-04-12 PROBLEM — R79.89 DECREASED TESTOSTERONE LEVEL: Chronic | Status: ACTIVE | Noted: 2024-04-12

## 2024-04-12 PROBLEM — N18.9 CHRONIC KIDNEY DISEASE: Status: ACTIVE | Noted: 2022-05-17

## 2024-04-12 PROBLEM — N18.9 CHRONIC KIDNEY DISEASE: Status: RESOLVED | Noted: 2022-05-17 | Resolved: 2024-04-12

## 2024-04-12 PROBLEM — R79.89 DECREASED TESTOSTERONE LEVEL: Status: ACTIVE | Noted: 2024-04-12

## 2024-04-12 PROBLEM — E11.3293 NONPROLIFERATIVE DIABETIC RETINOPATHY OF BOTH EYES: Status: ACTIVE | Noted: 2022-05-17

## 2024-04-12 PROBLEM — E11.3293 TYPE 2 DIABETES MELLITUS WITH MILD NONPROLIFERATIVE DIABETIC RETINOPATHY WITHOUT MACULAR EDEMA, BILATERAL: Status: RESOLVED | Noted: 2024-04-12 | Resolved: 2024-04-12

## 2024-04-12 PROBLEM — H35.372 LEFT EPIRETINAL MEMBRANE: Status: ACTIVE | Noted: 2023-06-13

## 2024-04-12 PROBLEM — R94.39 ABNORMAL STRESS TEST: Status: RESOLVED | Noted: 2023-02-16 | Resolved: 2024-04-12

## 2024-04-12 PROBLEM — C49.A2 GASTROINTESTINAL STROMAL TUMOR (GIST) OF STOMACH: Status: RESOLVED | Noted: 2023-10-11 | Resolved: 2024-04-12

## 2024-04-12 PROBLEM — G47.30 SLEEP APNEA: Status: ACTIVE | Noted: 2023-02-16

## 2024-04-12 PROBLEM — E29.1 TESTICULAR HYPOFUNCTION: Chronic | Status: ACTIVE | Noted: 2024-03-30

## 2024-04-12 PROBLEM — N52.9 ED (ERECTILE DYSFUNCTION): Status: RESOLVED | Noted: 2024-04-12 | Resolved: 2024-04-12

## 2024-04-12 PROBLEM — N52.9 ED (ERECTILE DYSFUNCTION): Status: ACTIVE | Noted: 2024-04-12

## 2024-04-12 PROBLEM — R94.39 ABNORMAL STRESS TEST: Status: ACTIVE | Noted: 2023-02-16

## 2024-04-12 PROBLEM — C49.A2 GASTROINTESTINAL STROMAL TUMOR (GIST) OF STOMACH: Status: ACTIVE | Noted: 2023-10-11

## 2024-04-12 PROBLEM — E11.3293 TYPE 2 DIABETES MELLITUS WITH MILD NONPROLIFERATIVE DIABETIC RETINOPATHY WITHOUT MACULAR EDEMA, BILATERAL: Status: ACTIVE | Noted: 2024-04-12

## 2024-04-12 PROCEDURE — 99215 OFFICE O/P EST HI 40 MIN: CPT | Mod: 25,S$GLB,, | Performed by: STUDENT IN AN ORGANIZED HEALTH CARE EDUCATION/TRAINING PROGRAM

## 2024-04-12 PROCEDURE — 1160F RVW MEDS BY RX/DR IN RCRD: CPT | Mod: CPTII,S$GLB,, | Performed by: STUDENT IN AN ORGANIZED HEALTH CARE EDUCATION/TRAINING PROGRAM

## 2024-04-12 PROCEDURE — 3078F DIAST BP <80 MM HG: CPT | Mod: CPTII,S$GLB,, | Performed by: STUDENT IN AN ORGANIZED HEALTH CARE EDUCATION/TRAINING PROGRAM

## 2024-04-12 PROCEDURE — 1101F PT FALLS ASSESS-DOCD LE1/YR: CPT | Mod: CPTII,S$GLB,, | Performed by: STUDENT IN AN ORGANIZED HEALTH CARE EDUCATION/TRAINING PROGRAM

## 2024-04-12 PROCEDURE — 1159F MED LIST DOCD IN RCRD: CPT | Mod: CPTII,S$GLB,, | Performed by: STUDENT IN AN ORGANIZED HEALTH CARE EDUCATION/TRAINING PROGRAM

## 2024-04-12 PROCEDURE — 99999 PR PBB SHADOW E&M-EST. PATIENT-LVL IV: CPT | Mod: PBBFAC,,, | Performed by: STUDENT IN AN ORGANIZED HEALTH CARE EDUCATION/TRAINING PROGRAM

## 2024-04-12 PROCEDURE — 3288F FALL RISK ASSESSMENT DOCD: CPT | Mod: CPTII,S$GLB,, | Performed by: STUDENT IN AN ORGANIZED HEALTH CARE EDUCATION/TRAINING PROGRAM

## 2024-04-12 PROCEDURE — 3044F HG A1C LEVEL LT 7.0%: CPT | Mod: CPTII,S$GLB,, | Performed by: STUDENT IN AN ORGANIZED HEALTH CARE EDUCATION/TRAINING PROGRAM

## 2024-04-12 PROCEDURE — 4010F ACE/ARB THERAPY RXD/TAKEN: CPT | Mod: CPTII,S$GLB,, | Performed by: STUDENT IN AN ORGANIZED HEALTH CARE EDUCATION/TRAINING PROGRAM

## 2024-04-12 PROCEDURE — 99397 PER PM REEVAL EST PAT 65+ YR: CPT | Mod: S$GLB,,, | Performed by: STUDENT IN AN ORGANIZED HEALTH CARE EDUCATION/TRAINING PROGRAM

## 2024-04-12 PROCEDURE — 3074F SYST BP LT 130 MM HG: CPT | Mod: CPTII,S$GLB,, | Performed by: STUDENT IN AN ORGANIZED HEALTH CARE EDUCATION/TRAINING PROGRAM

## 2024-04-12 PROCEDURE — 1126F AMNT PAIN NOTED NONE PRSNT: CPT | Mod: CPTII,S$GLB,, | Performed by: STUDENT IN AN ORGANIZED HEALTH CARE EDUCATION/TRAINING PROGRAM

## 2024-04-12 NOTE — PROGRESS NOTES
Plan:     Ori was seen today for establish care.    Diagnoses and all orders for this visit:    Preventative health care: Discussed age appropriate preventative healthcare items such as cancer screenings and recommended immunizations. Discussed whether patient is using tobacco, alcohol, or illicit drugs and any concerns were discussed. Discussed maintenance of a healthy weight. Patient queried if he has any additional questions about preventative healthcare and all questions were answered.    Encounter to establish care with new doctor: Chart reviewed and updated as documented below. No medication refills needed at this time.    Chronic sinusitis, unspecified location: Stable, continue intranasal steroid spray daily PRN     Coronary artery disease involving native coronary artery of native heart without angina pectoris: Stable, continue ASA 81 mg daily, Lipitor 20 mg daily + following w/ Cards    Hyperlipidemia, unspecified hyperlipidemia type: Stable, continue Lipitor 20 mg daily + following w/ Cards    Hypertension, unspecified type: Stable, continue losartan 100 mg daily, coreg 12.5 mg qhs  + following w/ Cards    BPH without obstruction/lower urinary tract symptoms: Stable, continue following w/ Urology w/ recommendation for annual PSA    History of gastric stromal tumor (benign): Stable, continue monitoring w/ yearly EGD, following w/ Heme/Onc and GI    History of diabetes mellitus, type II: Stable, A1c now well within normal range, continue following w/ Endo    Gastroesophageal reflux disease, unspecified whether esophagitis present: Stable, continue PPI daily     Spondylolisthesis of lumbar region: Stable, continue following w/ Dr. Brar     Primary insomnia: Stable, continue Trazodone 50 mg qhs PRN     Pt reports routine screening colonoscopy due and scheduled a few months from now.    Follow up in about 6 months (around 10/12/2024), or if symptoms worsen or fail to improve.    Mgagie Pino,  MD  04/12/2024    Subjective:      Patient ID: Ori Airta is a 74 y.o. male    Chief Complaint   Patient presents with    [facility]     HPI  74 y.o. male with a PMHx as documented below presents to clinic today for the following:    UTD w/ all vaccines - reports receiving pneumonia and tdap in 2023.     Follows w/ a number of specialists, as listed below:  - Cardiology: Dr. Ovalle w/ [facility] in [facility], Dr. Johnson on [facility] (all cardiac meds through Dr. Johnson)  - Ophthalmology: Dr. Wall  - Dermatology: Dr. Tapia   - Endocrinology: Dr. Murray  - Urology: Dr. Marie  - Heme/Onc: Dr. Sosa  - GI: Dr. Chanel  - Neuro: Dr. Asim Brar  - Bariatrics: Dr. Sosa  - Podiatry: Anders Jay IV    Essentially all meds through specialists - no refills needed at this time.     S/p gastric sleeve in 2023 - lost 65 lbs so far. Continues to follow w/ Dr. Sosa w/ Bariatrics.     Chronic sinusitis:   - Intranasal steroid spray daily PRN    CAD:   - Reports angio last year - found 'blockage' with collateral vessels developed (asymptomatic)   - ASA 81 mg daily, Lipitor 20 mg daily  - Follows w/ Cards    HTN:   - Losartan 100 mg daily, coreg 12.5 mg qhs   - Follows w/ Cards    HLD:   - Lipitor 20 mg daily  - Follows w/ Cards    BPH w/o lower urinary tract symptoms:   - Not currently on any pharmacologic therapy   - Routine f/u with annual PSA  - Follows w/ Urology     Hx of gastric stromal tumor:   - S/p resection; monitoring w/ yearly EGD   - Follows w/ Heme/Onc - Dr. Sosa  - Follows w/ GI - Dr. Chanel    Testicular hypofunction:   - Clomid 50 mg M/Th  - Follows w/ Endo    Hx of diabetes:   - Resolved w/ weight loss after gastric sleeve  - Follows w/ Endo    GERD:   - PPI daily PRN  - Follows w/ GI    Spondylolisthesis of lumbosacral region:   - Follows w/ Neuro - Dr. Asim Brar    Insomnia:   - Trazodone 50 mg qhs PRN    ROS  Constitutional:  Negative for chills and fever.   Respiratory:   Negative for shortness of breath.    Cardiovascular:  Negative for chest pain.   Gastrointestinal:  Negative for abdominal pain, constipation, diarrhea, nausea and vomiting.     Current Outpatient Medications   Medication Instructions    aspirin (ECOTRIN) 81 mg, Oral, Daily    atorvastatin (LIPITOR) 20 mg, Oral, Nightly    azelastine (ASTELIN) 137 mcg (0.1 %) nasal spray SPRAY 1 SPRAY BY NASAL ROUTE 2 TIMES DAILY.    carvediloL (COREG) 12.5 mg, Oral, Nightly    ciclesonide (OMNARIS) 50 mcg Spry 2 sprays, Each Nostril, 2 times daily    clindamycin (CLEOCIN T) 1 % external solution Topical (Top)    clomiPHENE (CLOMID) 50 mg, Oral, Twice weekly    fluticasone propionate (FLONASE) 50 mcg/actuation nasal spray Each Nostril    losartan (COZAAR) 100 mg, Oral, Daily    mupirocin (BACTROBAN) 2 % ointment APPLY TO AFFECTED AREA AS NEEDED    omega-3 acid ethyl esters (LOVAZA) 2 g, Oral, 2 times daily    omeprazole (PRILOSEC) 40 mg, Oral, Daily PRN    traZODone (DESYREL) 50 mg, Oral, Nightly    triamcinolone acetonide 0.1% (KENALOG) 0.1 % cream Daily PRN      Past Medical History:   Diagnosis Date    Abnormal stress test 02/16/2023    Anxiety     BPH without obstruction/lower urinary tract symptoms     Chronic sinusitis 03/30/2024    Coronary artery disease involving native coronary artery without angina pectoris 03/09/2023    Decreased testosterone level     Dyslipidemia 05/17/2022    ED (erectile dysfunction)     Gastroesophageal reflux disease 05/17/2022    Gastrointestinal stromal tumor (GIST) of stomach 10/11/2023    Hyperlipidemia     Hypertension     Left epiretinal membrane 06/13/2023    Nonproliferative diabetic retinopathy of both eyes 05/17/2022    NANCY (obstructive sleep apnea)     Primary insomnia     Seasonal allergies     Spondylolisthesis of lumbar region     Testicular hypofunction 03/30/2024    Type 2 diabetes mellitus with mild nonproliferative diabetic retinopathy without macular edema, bilateral      Past  "Surgical History:   Procedure Laterality Date    CATARACT EXTRACTION, BILATERAL  2018    CYST REMOVAL  195    Benign cysts behind right knee    CYST REMOVAL  2018    Benign cyst lower left eyelid    CYST REMOVAL  2019    Malignant cell cyst above left eye    HEMORRHOID SURGERY  10/11/2021    LAPAROSCOPIC SLEEVE GASTRECTOMY      SINUS SURGERY  2017    Deviated Septum     Review of patient's allergies indicates:   Allergen Reactions    Meperidine      Other reaction(s): UNKNOWN     Family History   Problem Relation Age of Onset    Hypertension Mother          AT AGE 85    Hyperlipidemia Mother     Hypertension Father          AT AGE 75    Hyperlipidemia Father     Hypertension Brother     Hyperlipidemia Brother     Anuerysm Brother          AT AGE 52     Social History     Tobacco Use    Smoking status: Never    Smokeless tobacco: Never   Substance Use Topics    Alcohol use: No     Currently on File with [facility]:   Most Recent Immunizations   Administered Date(s) Administered    COVID-19 MRNA, LN-S PF (MODERNA HALF 0.25 ML DOSE) 2022    COVID-19, MRNA, LN-S, PF (Pfizer) (Purple Cap) 2021    COVID-19, mRNA, LNP-S, PF (Moderna )Ages 12+ 2023    COVID-19, mRNA, LNP-S, bivalent booster, PF (Moderna Omicron)12 + YEARS 2023    Influenza 10/01/2014    Influenza (FLUAD) - Quadrivalent - Adjuvanted - PF *Preferred* (65+) 2023    Influenza - High Dose - PF (65 years and older) 10/01/2019    Influenza - Quadrivalent - High Dose - PF (65 years and older) 10/03/2021    Influenza - Trivalent (ADULT) 10/07/2009    Influenza - Trivalent - PF (ADULT) 10/01/2014    Influenza A (H1N1) 2009 Monovalent - IM 12/10/2009    Influenza Split 10/07/2009    RSVpreF (Arexvy) 2023    Zoster Recombinant 2018     Objective:      Vitals:    24 0946   BP: 120/70   Pulse: (!) 55   Resp: 20   SpO2: 97%   Weight: 85.3 kg (188 lb)   Height: 5' 4" (1.626 m)     Body mass index is 32.27 " kg/m².    Physical Exam   Constitutional:       General: No acute distress.  HENT:      Head: Normocephalic and atraumatic.   Pulmonary:      Effort: Pulmonary effort is normal. No respiratory distress.   Neurological:      General: No focal deficit present.      Mental Status: Alert and oriented to person, place, and time. Mental status is at baseline.    Assessment:       1. Preventative health care    2. Encounter to establish care with new doctor    3. Chronic sinusitis, unspecified location    4. Coronary artery disease involving native coronary artery of native heart without angina pectoris    5. Hyperlipidemia, unspecified hyperlipidemia type    6. Hypertension, unspecified type    7. BPH without obstruction/lower urinary tract symptoms    8. History of gastric stromal tumor (benign)    9. History of diabetes mellitus, type II    10. Gastroesophageal reflux disease, unspecified whether esophagitis present    11. Spondylolisthesis of lumbar region    12. Primary insomnia        Maggie Pino MD  [facility]  Office: [phone]   Fax: [phone]  04/12/2024      Disclaimer: This note was partly generated using dictation software which may occasionally result in transcription errors.    Total time spend on encounter: 40-54 minutes. This includes face to face time and non-face to face time preparing to see the patient (eg, review of tests), obtaining and/or reviewing separately obtained history, documenting clinical information in the electronic or other health record, independently interpreting results and communicating results to the patient/family/caregiver, or care coordinator.

## 2024-04-17 ENCOUNTER — PATIENT OUTREACH (OUTPATIENT)
Dept: ADMINISTRATIVE | Facility: HOSPITAL | Age: 74
End: 2024-04-17
Payer: MEDICARE

## 2024-04-17 NOTE — PROGRESS NOTES
Population Health Chart Review & Patient Outreach Details      Additional [facility] Health Notes:               Updates Requested / Reviewed:      Updated Care Coordination Note, , Care Team Updated, and Immunizations Reconciliation Completed or Queried: [facility] Topics Overdue:      [facility] Score: 2     Colon Cancer Screening  Foot Exam    Pneumonia Vaccine  Tetanus Vaccine                  Health Maintenance Topic(s) Outreach Outcomes & Actions Taken:    Diabetic Foot Exam - Outreach Outcomes & Actions Taken  : External Records Requested & Care Team Updated if Applicable

## 2024-04-17 NOTE — LETTER
AUTHORIZATION FOR RELEASE OF   CONFIDENTIAL INFORMATION    Dear Anders Jay IV, DPM,    We are seeing Ori Arita, date of birth 1950, in the clinic at [facility]. Maggie Pino MD is the patient's PCP. Ori Arita has an outstanding lab/procedure at the time we reviewed his chart. In order to help keep his health information updated, he has authorized us to request the following medical record(s):        (  )  MAMMOGRAM                                      (  )  COLONOSCOPY      (  )  PAP SMEAR                                          (  )  OUTSIDE LAB RESULTS     (  )  DEXA SCAN                                          (  )  EYE EXAM            (X)  FOOT EXAM                                          (  )  ENTIRE RECORD     (  )  OUTSIDE IMMUNIZATIONS                 (  )  _______________         Please fax records to [facility], Sjunnesen, Erica C., MD, [phone]    If you have any questions, please contact Reymundo Medeiros LPN Care Coordinator  at [phone].             Patient Name: Ori Arita  : 1950  Patient Phone #: [phone]

## 2024-04-24 ENCOUNTER — TELEPHONE (OUTPATIENT)
Dept: GASTROENTEROLOGY | Facility: CLINIC | Age: 74
End: 2024-04-24
Payer: MEDICARE

## 2024-04-24 NOTE — TELEPHONE ENCOUNTER
Attempted to reach the patient to notify him that his office visit on 06/04/2024 will need to be rescheduled as Dr. Chanel will not be in town, left message clinic number provided.

## 2024-04-25 ENCOUNTER — TELEPHONE (OUTPATIENT)
Dept: GASTROENTEROLOGY | Facility: CLINIC | Age: 74
End: 2024-04-25
Payer: MEDICARE

## 2024-04-25 NOTE — TELEPHONE ENCOUNTER
Attempted to reach the patient to reschedule 06/04/2024 appointment as Dr. Chanel will not be in town that date, left message, clinic number provided.

## 2024-04-26 ENCOUNTER — PATIENT OUTREACH (OUTPATIENT)
Dept: ADMINISTRATIVE | Facility: HOSPITAL | Age: 74
End: 2024-04-26
Payer: MEDICARE

## 2024-04-26 NOTE — PROGRESS NOTES
Population Health Chart Review & Patient Outreach Details      Additional [facility] Health Notes:               Updates Requested / Reviewed:      Updated Care Coordination Note, Care Everywhere, and          Health Maintenance Topics Overdue:      [facility] Score: 2     Colon Cancer Screening  Foot Exam    Pneumonia Vaccine  Tetanus Vaccine                  Health Maintenance Topic(s) Outreach Outcomes & Actions Taken:    Diabetic Foot Exam - Outreach Outcomes & Actions Taken  : External Records Uploaded, History & Care Team Updated if Applicable

## 2024-05-28 DIAGNOSIS — Z00.00 ENCOUNTER FOR MEDICARE ANNUAL WELLNESS EXAM: ICD-10-CM

## 2024-09-04 LAB
LEFT EYE DM RETINOPATHY: POSITIVE
RIGHT EYE DM RETINOPATHY: POSITIVE

## 2024-09-10 ENCOUNTER — OFFICE VISIT (OUTPATIENT)
Dept: GASTROENTEROLOGY | Facility: CLINIC | Age: 74
End: 2024-09-10
Payer: MEDICARE

## 2024-09-10 VITALS — WEIGHT: 188 LBS | HEIGHT: 64 IN | BODY MASS INDEX: 32.1 KG/M2

## 2024-09-10 DIAGNOSIS — I25.10 CORONARY ARTERY DISEASE INVOLVING NATIVE CORONARY ARTERY OF NATIVE HEART WITHOUT ANGINA PECTORIS: Chronic | ICD-10-CM

## 2024-09-10 DIAGNOSIS — K21.9 GASTROESOPHAGEAL REFLUX DISEASE, UNSPECIFIED WHETHER ESOPHAGITIS PRESENT: Primary | Chronic | ICD-10-CM

## 2024-09-10 DIAGNOSIS — Z86.0100 HISTORY OF COLONIC POLYPS: ICD-10-CM

## 2024-09-10 PROCEDURE — 99213 OFFICE O/P EST LOW 20 MIN: CPT | Mod: HCNC,S$GLB,, | Performed by: INTERNAL MEDICINE

## 2024-09-10 PROCEDURE — 4010F ACE/ARB THERAPY RXD/TAKEN: CPT | Mod: HCNC,CPTII,S$GLB, | Performed by: INTERNAL MEDICINE

## 2024-09-10 PROCEDURE — 1101F PT FALLS ASSESS-DOCD LE1/YR: CPT | Mod: HCNC,CPTII,S$GLB, | Performed by: INTERNAL MEDICINE

## 2024-09-10 PROCEDURE — 3288F FALL RISK ASSESSMENT DOCD: CPT | Mod: HCNC,CPTII,S$GLB, | Performed by: INTERNAL MEDICINE

## 2024-09-10 PROCEDURE — 99999 PR PBB SHADOW E&M-EST. PATIENT-LVL III: CPT | Mod: PBBFAC,HCNC,, | Performed by: INTERNAL MEDICINE

## 2024-09-10 PROCEDURE — 3044F HG A1C LEVEL LT 7.0%: CPT | Mod: HCNC,CPTII,S$GLB, | Performed by: INTERNAL MEDICINE

## 2024-09-10 PROCEDURE — 1126F AMNT PAIN NOTED NONE PRSNT: CPT | Mod: HCNC,CPTII,S$GLB, | Performed by: INTERNAL MEDICINE

## 2024-09-10 PROCEDURE — 1159F MED LIST DOCD IN RCRD: CPT | Mod: HCNC,CPTII,S$GLB, | Performed by: INTERNAL MEDICINE

## 2024-09-10 NOTE — PROGRESS NOTES
"Subjective     Patient ID: Lucina Arita is a 74 y.o. male.    Chief Complaint: No chief complaint on file.    Mr. Arita returns for routine follow-up, accompanied by his wife.  He has no complaints today.  He is doing well.  Especially with his weight loss.  See the weight table below.  He has lost about 60 lb since his gastric sleeve.  And he reports he no longer "has diabetes. "  See his surgeon's note, below.  Dr. Sosa is going to sit him up for an EGD, around October.  The CRS, Dr. Carver, did his hemorrhoid operation.  So she will do his colonoscopy next year, 2025.    Dr. Ovalle is his cardiologist.  He had an angiogram, and it showed 1 artery blocked.  However, it has revascularized around it, so that there was no need for attempt at stenting or bypass surgery.  Cardiac-wise, he is doing well.    He continues to work at the car [employer].      See Bariatric Surgeon's note  Aric Sosa MD - 07/05/2024 8:15 AM CDT  Formatting of this note is different from the original.  DATE: July 5, 2024  TIME: 8:48 AM    Follow-Up Note:  Subjective  Patient ID: Lucina Arita 1950 is a 74 y.o. male presenting for follow-up of Follow-up - Weight Loss Surgery (Sleeve 08/2023 )  Surgery: 8/23 SLEEVE postop.    I recommend an overall low calorie diet based on low carbs and lean protein. I also recommend a low calorie protein shake daily.  Continue the bariatric protocol with diet, vitamins, and exercise  Pt reports eating food for protein to include dairy sources, meats and other protein replacements. Pt is also aware they are supposed to be drinking protein supplements.  See below for plan.    Summary:  Currently, He is overall: GREAT OVERALL. NO CONCERNS.  N/v, constipation, gerd, diarrhea, shortness of breath: NONE  Diet: HEALTHY BUT LOTS OF SUGAR FREE POPCICLES 5 DAILY. NO FF  PO fluids: OK  Using Protein shakes/supplements: YES  Taking Bariatric vitamins as directed: OK. Labs if needed/ordered: JAN " OK  Exercising as recommended 3x/week minimally: WALKING  Meds: continue to follow instructions per medicine MD team if htn/dm comorbidities are present.  Tobacco Status: NO  NSAID Status: NO    GIST CONT CARE AND MONITORING WITH JAYMIE CHIN    WILL PLAN EGD ANNUALY PER JAYMIE RECS  RTC 3 MO FOR SCHEDULING    RTC Return in about 3 months (around 10/5/2024) for s/p sleeve.  Electronically signed by Aric Sosa MD at 07/05/2024 8:48 AM CDT     ASSESSMENT  1. Coronary artery disease involving native coronary artery without angina pectoris, unspecified whether native or transplanted heart   2. Hypertension, unspecified type   3. Dyslipidemia   4. Hyperlipidemia, unspecified hyperlipidemia type   5. Chronic kidney disease, unspecified CKD stage   6. S/P laparoscopic sleeve gastrectomy   7. Weight gain   8. Benign gastrointestinal stromal tumor (GIST)       PLAN  Pt was encouraged to continue with positive lifestyle changes including bariatric high protein, low carb diet, routine exercise and daily vitamin intake.   Protein: Recommend daily protein intake of 60-80 grams daily in the form of protein shakes and protein rich foods. Eat slowly over 30 minutes, take small bites, chew food well and minimize distractions while eating. Refer to the Bariatric Surgery Diet Handouts and Protein Handout for a list of protein shake and food options, if needed.  Fluids: Recommend at least 60 ounces of fluids daily. Take small, frequent sips. Remember to avoid carbonated beverages, sugary drinks, fruit juices and alcoholic beverages.   Exercise: Recommend an exercise routine 3 days/week for 120 minutes per week, as tolerated. Try to reach 8,000-10,000 steps per day.   Vitamins: Take recommended Bariatric vitamins daily. Refer to your Vitamin Supplement handout. Vitamin supplementation is for lifetime to prevent deficiencies. Continue daily bariatric supplements.   Make a follow up appointment with your PCP and take your routine  medications as per your PCP's recommendations.   Regarding any cardiac conditions (HTN, HLD, Arrhythmias, clotting disorders, CAD, PVD, tobacco use, CHF) : continue current medication and lifestyle changes at this time, confirm with your PCP and/or cardiologist, and we will continue to monitor your blood pressure and cardiac labs.    Regarding any Diabetes: Continue your current management and medications, confirm with your PCP and/or endocrinologist as well, as we will make no changes and will continue to monitor at this time and order labs as directed/scheduled.    Regarding any Sleep Apnea: continue current cpap settings, and use as directed. If your settings become uncomfortable, please reach out to me or your sleep MD team for a retitration.    Regarding any back pain, knee pain, hip pain: continue your current management, medication, and exercise routine. For any worsening conditions call your ortho/neuro MD for evaluation and imaging.    Regarding any pulmonary conditions: COPD, asthma, cough, sob on exertion: continue your current care and medication, confirm with your pulmonology MD, and we will continue to monitor.    Have routine follow up labs done prior to your next appointment, if ordered.     Call for any concerns or questions.         See Onc's note  Progress Notes  - documented in this encounter  Negrita Sosa MD - 10/11/2023 1:30 PM CDT  Formatting of this note is different from the original.  Ori Arita  is a 73 y.o. male patient.had 8/7/23 bariatric surgery by Dr Sosa. A polyp was remoded and was a spinda; stromal tumor , mitotic rate less than 5 per 6 mm2, G1 low grade , nearest margin 2.8 cm. One lymph node identified - free of tumor. pT1  positive , DOG1 IHC stain positive in tumor , Ki67 1%.  He has Hypertension, sleep apnea and hyperlipidemia. Some GERD  Ct scans done 10/4/23 were negative for tumor     Discussed plan with patient and answered questions:  GIST  tumor, with Ki67 of 1, large margins, 5 mitoses per 6 mm2  Patient has a very low risk of relapse and does not require adjuvant medications.  He should have follow up with GIST. And Anders Chanel MD for Gi will follow his upper endoscopy. And he knows about the GIST tumor  RTC 1 year with ct scans or F/u with Dr Darío WEBER  Electronically signed by Negrita Sosa MD at 10/11/2023 2:13 PM CDT         See last visit with Ant Sandoval Jr., MD at 9/12/2023  4:30 PM  Subjective  Patient ID: Ori Arita is a 73 y.o. male.   Chief Complaint: Other (F/u)   Mr. Arita returns, with his wife, for routine follow-up.  He underwent gastric sleeve surgery on August 7th at [facility] ( [facility]).  He did well with the surgery, and had an uneventful recovery.  He has since lost 40 lb (see weight table below).  He is in the process of stopping some of his medications (see Cardiology note below).  He has not had any significant reflux episodes.  No dysphagia.     Review of records shows that his next colonoscopy will be due in April, 2024.         Wt Readings from Last 20 Encounters:  04/12/24 : 85.3 kg (188 lb)  03/13/24 : 85.3 kg (188 lb)  11/01/23 : 91.2 kg (201 lb)  09/12/23 : 97.9 kg (215 lb 13.3 oz)  04/12/23 : 114.8 kg (253 lb)  03/14/23 : 115.3 kg (254 lb 3.1 oz)  10/19/22 : 113.4 kg (250 lb)  04/06/22 : 111.1 kg (245 lb)  03/15/22 : 111.9 kg (246 lb 11.1 oz)  10/15/21 : 104.8 kg (231 lb)  06/15/21 : 107.3 kg (236 lb 9.6 oz)  05/11/21 : 108.2 kg (238 lb 9.6 oz)  11/24/20 : 102 kg (224 lb 12.8 oz)  10/27/20 : 108.8 kg (239 lb 12.8 oz)  12/17/17 : 102.1 kg (225 lb)                 Review of Systems   Constitutional:  Negative for appetite change, fever and unexpected weight change.   HENT: Negative.  Negative for mouth sores, sore throat and trouble swallowing.    Eyes: Negative.    Respiratory: Negative.  Negative for cough and choking.    Cardiovascular: Negative.  Negative for  chest pain and leg swelling.   Gastrointestinal:  Negative for abdominal distention, abdominal pain, anal bleeding, blood in stool, constipation, diarrhea, nausea and vomiting.   Genitourinary: Negative.    Musculoskeletal: Negative.    Integumentary:  Negative for color change and rash. Negative.   Neurological: Negative.    Hematological:  Negative for adenopathy.   Psychiatric/Behavioral: Negative.            Objective     Physical Exam  Vitals and nursing note reviewed.   Constitutional:       Appearance: Normal appearance. He is well-developed.   HENT:      Mouth/Throat:      Pharynx: No oropharyngeal exudate.   Eyes:      General: No scleral icterus.  Neck:      Thyroid: No thyromegaly.      Trachea: No tracheal deviation.   Cardiovascular:      Rate and Rhythm: Normal rate and regular rhythm.      Heart sounds: Normal heart sounds.   Pulmonary:      Effort: Pulmonary effort is normal.      Breath sounds: Normal breath sounds.   Abdominal:      General: Bowel sounds are normal. There is no distension.      Palpations: Abdomen is soft. There is no mass.      Tenderness: There is no abdominal tenderness. There is no guarding.   Lymphadenopathy:      Cervical: No cervical adenopathy.   Skin:     General: Skin is warm and dry.      Findings: No rash.   Neurological:      Mental Status: He is alert and oriented to person, place, and time.            Assessment and Plan     Gastroesophageal reflux disease, unspecified whether esophagitis present    History of colonic polyps    Coronary artery disease involving native coronary artery of native heart without angina pectoris    Body mass index (BMI) of 32.0 to 32.9 in adult      Continue meds the same.  Continue diet and weight loss.  Follow-up with Dr. Carver as planned.  Return to clinic in 1 year, sooner p.r.n..         No follow-ups on file.

## 2024-12-30 ENCOUNTER — PATIENT OUTREACH (OUTPATIENT)
Dept: ADMINISTRATIVE | Facility: HOSPITAL | Age: 74
End: 2024-12-30
Payer: MEDICARE

## 2025-02-24 DIAGNOSIS — Z00.00 ENCOUNTER FOR MEDICARE ANNUAL WELLNESS EXAM: ICD-10-CM

## 2025-03-18 DIAGNOSIS — Z12.5 PROSTATE CANCER SCREENING: Primary | ICD-10-CM

## 2025-03-20 ENCOUNTER — OFFICE VISIT (OUTPATIENT)
Dept: FAMILY MEDICINE | Facility: CLINIC | Age: 75
End: 2025-03-20
Payer: MEDICARE

## 2025-03-20 ENCOUNTER — OFFICE VISIT (OUTPATIENT)
Dept: UROLOGY | Facility: CLINIC | Age: 75
End: 2025-03-20
Payer: MEDICARE

## 2025-03-20 VITALS
DIASTOLIC BLOOD PRESSURE: 62 MMHG | HEART RATE: 68 BPM | HEIGHT: 64 IN | BODY MASS INDEX: 32.11 KG/M2 | OXYGEN SATURATION: 98 % | WEIGHT: 188.06 LBS | SYSTOLIC BLOOD PRESSURE: 122 MMHG

## 2025-03-20 VITALS — BODY MASS INDEX: 30.73 KG/M2 | WEIGHT: 180 LBS | HEIGHT: 64 IN

## 2025-03-20 DIAGNOSIS — N40.0 BENIGN PROSTATIC HYPERPLASIA WITHOUT LOWER URINARY TRACT SYMPTOMS: Primary | ICD-10-CM

## 2025-03-20 DIAGNOSIS — Z00.01 ENCOUNTER FOR GENERAL ADULT MEDICAL EXAMINATION WITH ABNORMAL FINDINGS: Primary | ICD-10-CM

## 2025-03-20 DIAGNOSIS — Z86.39 HISTORY OF DIABETES MELLITUS, TYPE II: ICD-10-CM

## 2025-03-20 DIAGNOSIS — E78.2 MIXED HYPERLIPIDEMIA: ICD-10-CM

## 2025-03-20 DIAGNOSIS — K21.9 GASTROESOPHAGEAL REFLUX DISEASE, UNSPECIFIED WHETHER ESOPHAGITIS PRESENT: ICD-10-CM

## 2025-03-20 DIAGNOSIS — G47.00 INSOMNIA, UNSPECIFIED TYPE: ICD-10-CM

## 2025-03-20 DIAGNOSIS — N40.0 BPH WITHOUT OBSTRUCTION/LOWER URINARY TRACT SYMPTOMS: ICD-10-CM

## 2025-03-20 DIAGNOSIS — I25.10 CORONARY ARTERY DISEASE INVOLVING NATIVE CORONARY ARTERY OF NATIVE HEART WITHOUT ANGINA PECTORIS: ICD-10-CM

## 2025-03-20 DIAGNOSIS — I10 HYPERTENSION, UNSPECIFIED TYPE: ICD-10-CM

## 2025-03-20 LAB
BILIRUBIN, UA POC OHS: NEGATIVE
BLOOD, UA POC OHS: NEGATIVE
CLARITY, UA POC OHS: CLEAR
COLOR, UA POC OHS: ABNORMAL
GLUCOSE, UA POC OHS: NEGATIVE
KETONES, UA POC OHS: NEGATIVE
LEUKOCYTES, UA POC OHS: ABNORMAL
NITRITE, UA POC OHS: NEGATIVE
PH, UA POC OHS: 7
PROTEIN, UA POC OHS: NEGATIVE
SPECIFIC GRAVITY, UA POC OHS: 1.02
UROBILINOGEN, UA POC OHS: 0.2

## 2025-03-20 PROCEDURE — 1126F AMNT PAIN NOTED NONE PRSNT: CPT | Mod: HCNC,CPTII,S$GLB,

## 2025-03-20 PROCEDURE — 81003 URINALYSIS AUTO W/O SCOPE: CPT | Mod: QW,HCNC,S$GLB,

## 2025-03-20 PROCEDURE — 1101F PT FALLS ASSESS-DOCD LE1/YR: CPT | Mod: HCNC,CPTII,S$GLB,

## 2025-03-20 PROCEDURE — 99999 PR PBB SHADOW E&M-EST. PATIENT-LVL III: CPT | Mod: PBBFAC,HCNC,,

## 2025-03-20 PROCEDURE — 1160F RVW MEDS BY RX/DR IN RCRD: CPT | Mod: HCNC,CPTII,S$GLB,

## 2025-03-20 PROCEDURE — 3288F FALL RISK ASSESSMENT DOCD: CPT | Mod: HCNC,CPTII,S$GLB,

## 2025-03-20 PROCEDURE — 1159F MED LIST DOCD IN RCRD: CPT | Mod: HCNC,CPTII,S$GLB,

## 2025-03-20 PROCEDURE — 99213 OFFICE O/P EST LOW 20 MIN: CPT | Mod: HCNC,S$GLB,,

## 2025-03-20 PROCEDURE — 99999 PR PBB SHADOW E&M-EST. PATIENT-LVL III: CPT | Mod: PBBFAC,HCNC,, | Performed by: NURSE PRACTITIONER

## 2025-03-20 NOTE — PROGRESS NOTES
THIS DOCUMENT WAS MADE IN PART WITH VOICE RECOGNITION SOFTWARE.  OCCASIONALLY THIS SOFTWARE WILL MISINTERPRET WORDS OR PHRASES.     Assessment and Plan:    Encounter for general adult medical examination with abnormal findings  Comments:  Anticipatory guidance reviewed  Orders:  -     CBC Without Differential; Future; Expected date: 03/20/2025  -     Lipid Panel; Future; Expected date: 03/20/2025  -     TSH; Future; Expected date: 03/20/2025  -     Hemoglobin A1C; Future; Expected date: 03/20/2025  -     Comprehensive Metabolic Panel; Future; Expected date: 03/20/2025  -     Microalbumin/creatinine urine ratio    Hypertension, unspecified type  Comments:  Controlled  Continue losartan and carvedilol  Managed by cardiologist  Orders:  -     CBC Without Differential; Future; Expected date: 03/20/2025    Coronary artery disease involving native coronary artery of native heart without angina pectoris  Comments:  Stable  Continue ASA and Lipitor  Keep follow up with cardiologist    Gastroesophageal reflux disease, unspecified whether esophagitis present  Comments:  Symptoms controlled  Continue omeprazole    Mixed hyperlipidemia  Comments:  Controlled  Continue atorvastatin 20 mg  Due to repeat lipid panel  Orders:  -     CBC Without Differential; Future; Expected date: 03/20/2025  -     Lipid Panel; Future; Expected date: 03/20/2025  -     TSH; Future; Expected date: 03/20/2025  -     Hemoglobin A1C; Future; Expected date: 03/20/2025  -     Comprehensive Metabolic Panel; Future; Expected date: 03/20/2025    BPH without obstruction/lower urinary tract symptoms  Comments:  Denies symptoms  Follow up with urologist later today  Will be repeating PSA    History of diabetes mellitus, type II  Comments:  Resolve with weight loss  Orders:  -     Hemoglobin A1C; Future; Expected date: 03/20/2025  -     Comprehensive Metabolic Panel; Future; Expected date: 03/20/2025  -     Microalbumin/creatinine urine ratio    Insomnia,  unspecified type  Comments:  Symptoms controlled  Continue trazodone as needed         Visit summary:    Chronic conditions stable    Screening recommendations appropriate to age and health status were reviewed.     Continue to work on regular exercise, maintain healthy weight, balanced diet. Avoid unhealthy habits: smoking, excessive alcohol intake.     Follow up in about 6 months (around 9/20/2025) for Follow-up with PCP- Odette.   ______________________________________________________________________    Subjective:    Chief Complaint:  Annual exam    HPI:  Ori is a 75 y.o. year old patient here for annual exam- schedule labs.     He will be having his repeat colonoscopy completed -June 16th.  Dr. Franklin.  He needs clearance from PCP to proceed.       UTD w/ all vaccines - reports receiving pneumonia and tdap in 2023.         Follows w/ a number of specialists, as listed below:  - Cardiology: Dr. Ovalle w/ [facility] in [facility], Dr. Johnson on [facility] (all cardiac meds through Dr. Johnson)  - Ophthalmology: Dr. Wall  - Dermatology: Dr. Tapia   - Endocrinology: Dr. Murray  - Urology: Dr. Marie  - Heme/Onc: Dr. Sosa  - GI: Dr. Chanel  - Neuro: Dr. Asim Brar  - Bariatrics: Dr. Sosa  - Podiatry: Anders Jay IV     Essentially all meds through specialists - no refills needed at this time.      S/p gastric sleeve in 2023 - lost 65 lbs so far. Continues to follow w/ Dr. Sosa w/ Bariatrics.      Chronic sinusitis:   - Intranasal steroid spray daily PRN     CAD:   - Reports angio last year - found 'blockage' with collateral vessels developed (asymptomatic)   - ASA 81 mg daily, Lipitor 20 mg daily  - Follows w/ Cards     HTN:  Controlled  - Losartan 100 mg daily, coreg 12.5 mg qhs   - Follows w/ Cards     HLD:   - Lipitor 20 mg daily  - Follows w/ Cards     BPH w/o lower urinary tract symptoms:   - Not currently on any pharmacologic therapy   - Routine f/u with annual PSA  - Follows  w/ Urology - scheduled later today     Hx of gastric stromal tumor:   - S/p resection; monitoring w/ yearly EGD   - Follows w/ Heme/Onc - Dr. Sosa  - Follows w/ GI - Dr. Chanel     Testicular hypofunction:   - Clomid 50 mg /  - Follows w/ Endo     Hx of diabetes:   - Resolved w/ weight loss after gastric sleeve  - Follows w/ Endo     GERD:   - PPI daily PRN  - Follows w/ GI     Spondylolisthesis of lumbosacral region:   - Follows w/ Neuro - Dr. Asim Brar     Insomnia:   - Trazodone 50 mg qhs PRN        Past Medical History:  Past Medical History:   Diagnosis Date    Abnormal stress test 2023    Anxiety     BPH without obstruction/lower urinary tract symptoms     Chronic sinusitis 2024    Coronary artery disease involving native coronary artery without angina pectoris 2023    Decreased testosterone level     Dyslipidemia 2022    ED (erectile dysfunction)     Gastroesophageal reflux disease 2022    Gastrointestinal stromal tumor (GIST) of stomach 10/11/2023    Hyperlipidemia     Hypertension     Left epiretinal membrane 2023    Nonproliferative diabetic retinopathy of both eyes 2022    NANCY (obstructive sleep apnea)     Primary insomnia     Seasonal allergies     Spondylolisthesis of lumbar region     Testicular hypofunction 2024    Type 2 diabetes mellitus with mild nonproliferative diabetic retinopathy without macular edema, bilateral        Past Surgical History:  Past Surgical History:   Procedure Laterality Date    CATARACT EXTRACTION, BILATERAL  2018    CYST REMOVAL      Benign cysts behind right knee    CYST REMOVAL  2018    Benign cyst lower left eyelid    CYST REMOVAL  2019    Malignant cell cyst above left eye    HEMORRHOID SURGERY  10/11/2021    LAPAROSCOPIC SLEEVE GASTRECTOMY      SINUS SURGERY  2017    Deviated Septum       Family History:  Family History   Problem Relation Name Age of Onset    Hypertension Mother           AT AGE 85     Hyperlipidemia Mother      Hypertension Father           AT AGE 75    Hyperlipidemia Father      Hypertension Brother      Hyperlipidemia Brother      Anuerysm Brother           AT AGE 52       Social History:  Social History     Socioeconomic History    Marital status:      Spouse name: JACKIE ORDAZ    Number of children: 2   Occupational History    Occupation: [employer]     Comment: [employer]   Tobacco Use    Smoking status: Never     Passive exposure: Never    Smokeless tobacco: Never   Substance and Sexual Activity    Alcohol use: No   Social History Narrative     to Jackie since .  2 kids. No contact w son.       Medications:  Medications Ordered Prior to Encounter[1]    Allergies:  Meperidine and Tramadol    Immunizations:  Immunization History   Administered Date(s) Administered    COVID-19 MRNA, LN-S PF (MODERNA HALF 0.25 ML DOSE) 2022    COVID-19, MRNA, LN-S, PF (Pfizer) (Purple Cap) 2021, 2021    COVID-19, mRNA, LNP-S, PF (Moderna) Ages 12+ 2023    COVID-19, mRNA, LNP-S, PF, brook-sucrose, 30 mcg/0.3 mL (Pfizer Ages 12+) 2024    COVID-19, mRNA, LNP-S, bivalent booster, PF (Moderna Omicron)12 + YEARS 2023    Influenza 10/07/2009, 12/10/2009, 10/01/2014    Influenza (FLUAD) - Quadrivalent - Adjuvanted - PF *Preferred* (65+) 2020, 2023    Influenza - Quadrivalent - High Dose - PF (65 years and older) 10/03/2021    Influenza - Trivalent - Afluria, Fluzone MDV 10/07/2009    Influenza - Trivalent - Fluarix, Flulaval, Fluzone, Afluria - PF 10/01/2014    Influenza - Trivalent - Fluzone High Dose - PF (65 years and older) 2017, 10/02/2018, 10/01/2019, 2024    Influenza A (H1N1) 2009 Monovalent - IM 12/10/2009    Influenza Split 10/07/2009    RSVpreF (Arexvy) 2023    Zoster Recombinant 2018, 2018       Review of Systems:  Review of Systems   Constitutional:  Negative for chills, fatigue and fever.  "  HENT:  Negative for congestion and rhinorrhea.    Respiratory:  Negative for cough and shortness of breath.    Cardiovascular:  Negative for chest pain, palpitations and leg swelling.   Gastrointestinal:  Negative for diarrhea, nausea and vomiting.   Skin:  Negative for rash.   Neurological:  Negative for dizziness, weakness, light-headedness and headaches.       Objective:    Vitals:  Vitals:    03/20/25 0825   BP: 122/62   Pulse: 68   SpO2: 98%   Weight: 85.3 kg (188 lb 0.8 oz)   Height: 5' 4" (1.626 m)       Physical Exam  Vitals and nursing note reviewed.   Constitutional:       General: He is not in acute distress.     Appearance: He is obese.   HENT:      Head: Normocephalic and atraumatic.      Nose: Nose normal.      Mouth/Throat:      Mouth: Mucous membranes are moist.      Pharynx: Oropharynx is clear.   Eyes:      General: No scleral icterus.     Conjunctiva/sclera: Conjunctivae normal.   Cardiovascular:      Rate and Rhythm: Normal rate and regular rhythm.   Pulmonary:      Effort: Pulmonary effort is normal. No respiratory distress.      Breath sounds: Normal breath sounds.   Abdominal:      General: Bowel sounds are normal. There is no distension.      Palpations: Abdomen is soft.      Tenderness: There is no abdominal tenderness.   Musculoskeletal:      Cervical back: Neck supple.      Right lower leg: No edema.      Left lower leg: No edema.   Lymphadenopathy:      Cervical: No cervical adenopathy.   Skin:     General: Skin is warm and dry.   Neurological:      Mental Status: He is alert and oriented to person, place, and time.   Psychiatric:         Mood and Affect: Mood normal.         Behavior: Behavior normal.         Thought Content: Thought content normal.         Data:  Wt Readings from Last 3 Encounters:   03/20/25 81.6 kg (180 lb)   03/20/25 85.3 kg (188 lb 0.8 oz)   09/10/24 85.3 kg (188 lb)          Lab Results   Component Value Date    HGBA1C 5.4 04/02/2024    HGBA1C 5.1 10/18/2023    "     Medical history reviewed, Medications reconciled.          KIMBERLY Lord-C  Family Medicine             [1]   Current Outpatient Medications on File Prior to Visit   Medication Sig Dispense Refill    aspirin (ECOTRIN) 81 MG EC tablet Take 81 mg by mouth once daily.      atorvastatin (LIPITOR) 20 MG tablet Take 1 tablet (20 mg total) by mouth every evening. 90 tablet 3    azelastine (ASTELIN) 137 mcg (0.1 %) nasal spray SPRAY 1 SPRAY BY NASAL ROUTE 2 TIMES DAILY. 10 mL 11    carvediloL (COREG) 12.5 MG tablet Take 12.5 mg by mouth every evening.      ciclesonide (OMNARIS) 50 mcg Spry 2 sprays by Each Nostril route 2 (two) times daily.      clindamycin (CLEOCIN T) 1 % external solution Apply topically.      clomiPHENE (CLOMID) 50 mg tablet Take 50 mg by mouth twice a week.       fluticasone propionate (FLONASE) 50 mcg/actuation nasal spray by Each Nostril route.      losartan (COZAAR) 100 MG tablet Take 1 tablet (100 mg total) by mouth once daily. 90 tablet 3    mupirocin (BACTROBAN) 2 % ointment       omega-3 acid ethyl esters (LOVAZA) 1 gram capsule Take 2 g by mouth 2 (two) times daily.      omeprazole (PRILOSEC) 40 MG capsule Take 40 mg by mouth daily as needed.      traZODone (DESYREL) 50 MG tablet Take 1 tablet (50 mg total) by mouth every evening. 90 tablet 3    triamcinolone acetonide 0.1% (KENALOG) 0.1 % cream daily as needed.       No current facility-administered medications on file prior to visit.

## 2025-03-20 NOTE — PROGRESS NOTES
[facility] Note  Staff: KRIS Woods    PCP: MD Odette    Chief Complaint: annual    Subjective:        HPI: Ori Arita is a 75 y.o. male new patient to me presents today for annual exam. He was last seen by Dr. Marie. He is due to update his PSA. He is currently not taking any prostate medications. He denies dysuria, hematuria, fever, flank pain, and difficulty urinating. He prefers to continue with yearly PSA.     Questions asked the pt during ov today:  Urgency: no, urge incontinence? no  NTF: 0x night  Dysuria: No  Gross Hematuria:No  Straining:No, Hesistancy:No, Intermittency:No}, Weak stream:No    Last PSA Screening:   Lab Results   Component Value Date    PSADIAG 2.43 10/18/2023       History of Kidney Stones?:  no    Constipation issues?:  no    REVIEW OF SYSTEMS:  Review of Systems   Constitutional: Negative.  Negative for chills and fever.   Gastrointestinal: Negative.  Negative for abdominal pain, constipation, diarrhea, nausea and vomiting.   Genitourinary: Negative.  Negative for dysuria, flank pain, frequency, hematuria and urgency.   Musculoskeletal: Negative.  Negative for back pain.       PMHx:  Past Medical History:   Diagnosis Date    Abnormal stress test 02/16/2023    Anxiety     BPH without obstruction/lower urinary tract symptoms     Chronic sinusitis 03/30/2024    Coronary artery disease involving native coronary artery without angina pectoris 03/09/2023    Decreased testosterone level     Dyslipidemia 05/17/2022    ED (erectile dysfunction)     Gastroesophageal reflux disease 05/17/2022    Gastrointestinal stromal tumor (GIST) of stomach 10/11/2023    Hyperlipidemia     Hypertension     Left epiretinal membrane 06/13/2023    Nonproliferative diabetic retinopathy of both eyes 05/17/2022    NANCY (obstructive sleep apnea)     Primary insomnia     Seasonal allergies     Spondylolisthesis of lumbar region     Testicular hypofunction 03/30/2024    Type 2  diabetes mellitus with mild nonproliferative diabetic retinopathy without macular edema, bilateral        PSHx:  Past Surgical History:   Procedure Laterality Date    CATARACT EXTRACTION, BILATERAL  2018    CYST REMOVAL  1958    Benign cysts behind right knee    CYST REMOVAL  2018    Benign cyst lower left eyelid    CYST REMOVAL  2019    Malignant cell cyst above left eye    HEMORRHOID SURGERY  10/11/2021    LAPAROSCOPIC SLEEVE GASTRECTOMY      SINUS SURGERY  2017    Deviated Septum       Fam Hx:   malignancies: No    kidney stones: No     Soc Hx:  , lives in [place]    Allergies:  Meperidine and Tramadol    Medications: reviewed     Objective:   There were no vitals filed for this visit.    Physical Exam  Constitutional:       Appearance: Normal appearance.   Pulmonary:      Effort: Pulmonary effort is normal.   Abdominal:      General: There is no distension.      Palpations: Abdomen is soft.      Tenderness: There is no abdominal tenderness.   Musculoskeletal:         General: Normal range of motion.      Cervical back: Normal range of motion.   Skin:     General: Skin is warm.   Neurological:      Mental Status: He is oriented to person, place, and time.   Psychiatric:         Mood and Affect: Mood normal.         Behavior: Behavior normal.       LABS REVIEW:  UA today:  Color:Clear, Yellow  Spec. Grav.  1.020  PH  7.0  Negative for nitrates, protein, glucose, ketones, urobili, bili, and blood.  Trace leuks    Assessment:       1. Benign prostatic hyperplasia without lower urinary tract symptoms          Plan:     Update PSA, ordered    F/u annually    [facility]: active    KRIS Woods

## 2025-04-01 ENCOUNTER — PATIENT MESSAGE (OUTPATIENT)
Dept: ADMINISTRATIVE | Facility: HOSPITAL | Age: 75
End: 2025-04-01
Payer: MEDICARE

## 2025-06-04 ENCOUNTER — TELEPHONE (OUTPATIENT)
Dept: FAMILY MEDICINE | Facility: CLINIC | Age: 75
End: 2025-06-04
Payer: MEDICARE